# Patient Record
Sex: MALE | Race: WHITE | NOT HISPANIC OR LATINO
[De-identification: names, ages, dates, MRNs, and addresses within clinical notes are randomized per-mention and may not be internally consistent; named-entity substitution may affect disease eponyms.]

---

## 2017-03-16 ENCOUNTER — APPOINTMENT (OUTPATIENT)
Dept: UROLOGY | Facility: CLINIC | Age: 62
End: 2017-03-16

## 2017-03-23 ENCOUNTER — APPOINTMENT (OUTPATIENT)
Dept: UROLOGY | Facility: CLINIC | Age: 62
End: 2017-03-23

## 2017-03-23 DIAGNOSIS — N39.46 MIXED INCONTINENCE: ICD-10-CM

## 2017-03-23 DIAGNOSIS — N28.1 CYST OF KIDNEY, ACQUIRED: ICD-10-CM

## 2017-03-24 LAB
APPEARANCE: CLEAR
BACTERIA: NEGATIVE
BILIRUBIN URINE: NEGATIVE
BLOOD URINE: NEGATIVE
COLOR: YELLOW
CORE LAB FLUID CYTOLOGY: NORMAL
GLUCOSE QUALITATIVE U: NORMAL MG/DL
KETONES URINE: NEGATIVE
LEUKOCYTE ESTERASE URINE: NEGATIVE
MICROSCOPIC-UA: NORMAL
NITRITE URINE: NEGATIVE
PH URINE: 6.5
PROTEIN URINE: NEGATIVE MG/DL
PSA FREE FLD-MCNC: NORMAL
PSA FREE SERPL-MCNC: <0.01 NG/ML
PSA SERPL-MCNC: <0.01 NG/ML
RED BLOOD CELLS URINE: 16 /HPF
SPECIFIC GRAVITY URINE: 1.02
SQUAMOUS EPITHELIAL CELLS: 0 /HPF
UROBILINOGEN URINE: NORMAL MG/DL
WHITE BLOOD CELLS URINE: 0 /HPF

## 2017-06-14 ENCOUNTER — APPOINTMENT (OUTPATIENT)
Dept: CT IMAGING | Facility: IMAGING CENTER | Age: 62
End: 2017-06-14

## 2017-06-14 ENCOUNTER — OUTPATIENT (OUTPATIENT)
Dept: OUTPATIENT SERVICES | Facility: HOSPITAL | Age: 62
LOS: 1 days | End: 2017-06-14
Payer: COMMERCIAL

## 2017-06-14 DIAGNOSIS — C61 MALIGNANT NEOPLASM OF PROSTATE: ICD-10-CM

## 2017-06-14 PROCEDURE — 82565 ASSAY OF CREATININE: CPT

## 2017-06-14 PROCEDURE — 74178 CT ABD&PLV WO CNTR FLWD CNTR: CPT

## 2017-06-27 ENCOUNTER — OUTPATIENT (OUTPATIENT)
Dept: OUTPATIENT SERVICES | Facility: HOSPITAL | Age: 62
LOS: 1 days | End: 2017-06-27
Payer: MEDICAID

## 2017-06-27 ENCOUNTER — APPOINTMENT (OUTPATIENT)
Dept: ULTRASOUND IMAGING | Facility: IMAGING CENTER | Age: 62
End: 2017-06-27

## 2017-06-27 DIAGNOSIS — C61 MALIGNANT NEOPLASM OF PROSTATE: ICD-10-CM

## 2017-06-27 PROCEDURE — 76770 US EXAM ABDO BACK WALL COMP: CPT

## 2018-03-22 ENCOUNTER — APPOINTMENT (OUTPATIENT)
Dept: UROLOGY | Facility: CLINIC | Age: 63
End: 2018-03-22
Payer: COMMERCIAL

## 2018-03-22 ENCOUNTER — OUTPATIENT (OUTPATIENT)
Dept: OUTPATIENT SERVICES | Facility: HOSPITAL | Age: 63
LOS: 1 days | End: 2018-03-22
Payer: MEDICAID

## 2018-03-22 ENCOUNTER — APPOINTMENT (OUTPATIENT)
Dept: UROLOGY | Facility: CLINIC | Age: 63
End: 2018-03-22

## 2018-03-22 ENCOUNTER — APPOINTMENT (OUTPATIENT)
Dept: CT IMAGING | Facility: IMAGING CENTER | Age: 63
End: 2018-03-22
Payer: COMMERCIAL

## 2018-03-22 VITALS
WEIGHT: 218 LBS | HEIGHT: 70 IN | DIASTOLIC BLOOD PRESSURE: 91 MMHG | RESPIRATION RATE: 16 BRPM | HEART RATE: 82 BPM | TEMPERATURE: 98 F | SYSTOLIC BLOOD PRESSURE: 145 MMHG | BODY MASS INDEX: 31.21 KG/M2

## 2018-03-22 DIAGNOSIS — R35.0 FREQUENCY OF MICTURITION: ICD-10-CM

## 2018-03-22 DIAGNOSIS — Z00.00 ENCOUNTER FOR GENERAL ADULT MEDICAL EXAMINATION WITHOUT ABNORMAL FINDINGS: ICD-10-CM

## 2018-03-22 LAB
APPEARANCE: CLEAR
BACTERIA: NEGATIVE
BILIRUBIN URINE: NEGATIVE
BLOOD URINE: ABNORMAL
COLOR: YELLOW
GLUCOSE QUALITATIVE U: NEGATIVE MG/DL
KETONES URINE: NEGATIVE
LEUKOCYTE ESTERASE URINE: NEGATIVE
MICROSCOPIC-UA: NORMAL
NITRITE URINE: NEGATIVE
PH URINE: 7
PROTEIN URINE: NEGATIVE MG/DL
RED BLOOD CELLS URINE: 7 /HPF
SPECIFIC GRAVITY URINE: 1.03
SQUAMOUS EPITHELIAL CELLS: 0 /HPF
UROBILINOGEN URINE: NEGATIVE MG/DL
WHITE BLOOD CELLS URINE: 1 /HPF

## 2018-03-22 PROCEDURE — 74178 CT ABD&PLV WO CNTR FLWD CNTR: CPT | Mod: 26

## 2018-03-22 PROCEDURE — 52000 CYSTOURETHROSCOPY: CPT

## 2018-03-22 PROCEDURE — 99214 OFFICE O/P EST MOD 30 MIN: CPT | Mod: 25

## 2018-03-22 PROCEDURE — 74178 CT ABD&PLV WO CNTR FLWD CNTR: CPT

## 2018-03-23 LAB — CORE LAB FLUID CYTOLOGY: NORMAL

## 2018-03-24 LAB — BACTERIA UR CULT: NORMAL

## 2018-03-27 DIAGNOSIS — N21.0 CALCULUS IN BLADDER: ICD-10-CM

## 2018-03-27 DIAGNOSIS — R35.0 FREQUENCY OF MICTURITION: ICD-10-CM

## 2018-03-27 DIAGNOSIS — C61 MALIGNANT NEOPLASM OF PROSTATE: ICD-10-CM

## 2018-04-19 ENCOUNTER — APPOINTMENT (OUTPATIENT)
Dept: UROLOGY | Facility: CLINIC | Age: 63
End: 2018-04-19

## 2018-05-22 ENCOUNTER — APPOINTMENT (OUTPATIENT)
Dept: UROLOGY | Facility: CLINIC | Age: 63
End: 2018-05-22
Payer: COMMERCIAL

## 2018-05-22 DIAGNOSIS — C61 MALIGNANT NEOPLASM OF PROSTATE: ICD-10-CM

## 2018-05-22 DIAGNOSIS — N21.0 CALCULUS IN BLADDER: ICD-10-CM

## 2018-05-22 DIAGNOSIS — N35.9 URETHRAL STRICTURE, UNSPECIFIED: ICD-10-CM

## 2018-05-22 DIAGNOSIS — Z00.00 ENCOUNTER FOR GENERAL ADULT MEDICAL EXAMINATION W/OUT ABNORMAL FINDINGS: ICD-10-CM

## 2018-05-22 PROCEDURE — 52000 CYSTOURETHROSCOPY: CPT

## 2018-05-22 PROCEDURE — 99214 OFFICE O/P EST MOD 30 MIN: CPT | Mod: 25

## 2018-05-23 LAB
APPEARANCE: CLEAR
BACTERIA: NEGATIVE
BILIRUBIN URINE: NEGATIVE
BLOOD URINE: NEGATIVE
COLOR: YELLOW
GLUCOSE QUALITATIVE U: NEGATIVE MG/DL
HYALINE CASTS: 0 /LPF
KETONES URINE: NEGATIVE
LEUKOCYTE ESTERASE URINE: NEGATIVE
MICROSCOPIC-UA: NORMAL
NITRITE URINE: NEGATIVE
PH URINE: 6.5
PROTEIN URINE: ABNORMAL MG/DL
PSA FREE FLD-MCNC: NORMAL
PSA FREE SERPL-MCNC: <0.01 NG/ML
PSA SERPL-MCNC: <0.01 NG/ML
RED BLOOD CELLS URINE: 1 /HPF
SPECIFIC GRAVITY URINE: 1.02
SQUAMOUS EPITHELIAL CELLS: 0 /HPF
UROBILINOGEN URINE: NEGATIVE MG/DL
WHITE BLOOD CELLS URINE: 2 /HPF

## 2018-05-24 LAB
BACTERIA UR CULT: NORMAL
CORE LAB FLUID CYTOLOGY: NORMAL

## 2018-06-18 ENCOUNTER — APPOINTMENT (OUTPATIENT)
Dept: UROLOGY | Facility: AMBULATORY SURGERY CENTER | Age: 63
End: 2018-06-18

## 2018-06-28 RX ORDER — SOLIFENACIN SUCCINATE 5 MG/1
5 TABLET, FILM COATED ORAL
Qty: 90 | Refills: 2 | Status: ACTIVE | COMMUNITY
Start: 2018-06-28 | End: 1900-01-01

## 2018-10-23 ENCOUNTER — RX RENEWAL (OUTPATIENT)
Age: 63
End: 2018-10-23

## 2018-11-06 ENCOUNTER — APPOINTMENT (OUTPATIENT)
Dept: CARDIOLOGY | Facility: CLINIC | Age: 63
End: 2018-11-06
Payer: COMMERCIAL

## 2018-11-06 VITALS
BODY MASS INDEX: 31.5 KG/M2 | RESPIRATION RATE: 16 BRPM | WEIGHT: 220 LBS | SYSTOLIC BLOOD PRESSURE: 130 MMHG | OXYGEN SATURATION: 97 % | HEART RATE: 100 BPM | HEIGHT: 70 IN | DIASTOLIC BLOOD PRESSURE: 110 MMHG

## 2018-11-06 VITALS — DIASTOLIC BLOOD PRESSURE: 100 MMHG | SYSTOLIC BLOOD PRESSURE: 160 MMHG

## 2018-11-06 DIAGNOSIS — Z86.79 PERSONAL HISTORY OF OTHER DISEASES OF THE CIRCULATORY SYSTEM: ICD-10-CM

## 2018-11-06 PROCEDURE — 99244 OFF/OP CNSLTJ NEW/EST MOD 40: CPT

## 2018-11-06 NOTE — ASSESSMENT
[FreeTextEntry1] : Assessment:\par 1.  Chronic Deep venous insufficiency\par - hyperpigmentation\par - no visible superficial varicose veins\par - not wearing compression\par 2.  Resistant HTN\par - on 5 agents\par - not controlled in the office. \par 3.  Obesity\par 4.  Coronary disease \par 5.  Atrial fibrillation\par 6.  BRYSON\par 7.  Peripheral Neuropathy\par \par Plan\par 1.  In regards to the leg swelling:\par - He will initiate a trial of compression for deep venous insufficiency\par - weight loss\par - leg elevation\par - he is on Apixaban for Afib - doubt DVT.\par 2.  In regards to resistant HTN:\par -  I have discussed with Dr. Rodríguez if he can be evaluated for the SADIA HTN trial.\par - I will have the [employer] research department reach out to him for a SADIA screening visit\par \par Thanks\par \par Huang Barakat

## 2018-11-06 NOTE — REASON FOR VISIT
[Consultation] : a consultation regarding [FreeTextEntry2] : leg swelling, venous insufficiency, resistant HTN [FreeTextEntry1] :  Dr. Rodríguez, \par \par Thank you for allowing me to care for Mr. Dhillon.  As you know, he is a very pleasant 63 year old male with a history of atrial fibrillation, HTN, HLD, obesity.\par \par He is here for a vascular medicine consultation for leg swelling.  \par \par   The legs have been swollen for months.  Initially this was thought to be because of medications, but this was ruled out.  The legs remain swollen even after stopping a calcium channel blocker.  He is overweight.  He has developed a small ulceration on the left leg - for which he is placing a bandaid on.  He is unrestricted in his walking.  He feels like he is walking on pins and needles - likely has an element of peripheral neuropathy.  The toes and feet are a bluish /purple discoloration.  There is bilateral leg hyperpigmentation.  He just purchased compression stockings - has not worn this.  \par \par Currently he is not working, but usually works outdoors - [employer] department - [employer]. On his feet all day.  \par \par Medications:\par Eliquis 5mg BID\par Aspiin 81mg daily\par Atrovatstin 40mg daily\par Furosemide 40mg daily\par Losartan - HCTZ 100-25mg \par metoprolol 100mg daily\par Clonidine 0.3mg at night\par \par He is on 5 medications for HTN.  His blood pressure in the right arm (which is higher  is 160 systolic)\par

## 2018-11-06 NOTE — PHYSICAL EXAM
[General Appearance - Well Developed] : well developed [Normal Appearance] : normal appearance [Well Groomed] : well groomed [General Appearance - Well Nourished] : well nourished [No Deformities] : no deformities [General Appearance - In No Acute Distress] : no acute distress [Normal Conjunctiva] : the conjunctiva exhibited no abnormalities [Eyelids - No Xanthelasma] : the eyelids demonstrated no xanthelasmas [Normal Oral Mucosa] : normal oral mucosa [No Oral Pallor] : no oral pallor [No Oral Cyanosis] : no oral cyanosis [Heart Rate And Rhythm] : heart rate and rhythm were normal [Heart Sounds] : normal S1 and S2 [Murmurs] : no murmurs present [Respiration, Rhythm And Depth] : normal respiratory rhythm and effort [Exaggerated Use Of Accessory Muscles For Inspiration] : no accessory muscle use [Auscultation Breath Sounds / Voice Sounds] : lungs were clear to auscultation bilaterally [Abdomen Soft] : soft [Abdomen Tenderness] : non-tender [Abdomen Mass (___ Cm)] : no abdominal mass palpated [Abnormal Walk] : normal gait [Gait - Sufficient For Exercise Testing] : the gait was sufficient for exercise testing [Normal Jugular Venous A Waves Present] : normal jugular venous A waves present [Normal Jugular Venous V Waves Present] : normal jugular venous V waves present [No Jugular Venous Perdomo A Waves] : no jugular venous perdomo A waves [FreeTextEntry1] : bilateral edema.  hyperpigmentation bilaterally.  strong, palp pedal pulses.  no visibile varicose veins.    Small, shallow, clean base ulcer of the L anterior shin - healing.  [Skin Color & Pigmentation] : normal skin color and pigmentation [] : no rash [No Venous Stasis] : no venous stasis [Skin Lesions] : no skin lesions [No Skin Ulcers] : no skin ulcer [No Xanthoma] : no  xanthoma was observed [Oriented To Time, Place, And Person] : oriented to person, place, and time [Affect] : the affect was normal [Mood] : the mood was normal [No Anxiety] : not feeling anxious

## 2019-01-28 ENCOUNTER — APPOINTMENT (OUTPATIENT)
Dept: CARDIOLOGY | Facility: CLINIC | Age: 64
End: 2019-01-28
Payer: COMMERCIAL

## 2019-01-28 VITALS
BODY MASS INDEX: 31.21 KG/M2 | DIASTOLIC BLOOD PRESSURE: 101 MMHG | HEIGHT: 70 IN | OXYGEN SATURATION: 97 % | SYSTOLIC BLOOD PRESSURE: 147 MMHG | WEIGHT: 218 LBS | HEART RATE: 84 BPM

## 2019-01-28 VITALS — TEMPERATURE: 98.7 F | RESPIRATION RATE: 14 BRPM

## 2019-01-28 PROCEDURE — 99214 OFFICE O/P EST MOD 30 MIN: CPT

## 2019-01-31 ENCOUNTER — RX RENEWAL (OUTPATIENT)
Age: 64
End: 2019-01-31

## 2019-02-04 NOTE — ASSESSMENT
[FreeTextEntry1] : Assessment:\par 1.  Chronic Deep venous insufficiency\par - Currently well controlled and clinically insignificant\par - No edema on the right trace edema on the left\par 2.  Resistant HTN\par - on 4 agents\par - not controlled in the office. \par - no YULIA\par - no BRYSON\par 3.  Obesity\par 4.  Coronary disease with PCI\par 5.  Atrial fibrillation on A/C \par 6.  Negative BRYSON testing per patient\par 7.  Peripheral Neuropathy\par \par Plan\par 1.  In regards to the leg swelling, it has resolved with lifestyle modifications.  There is no edema of the R leg and trace ankle edema on the left.  It is not clinically significant.  \par  2.  In regards to resistant HTN:\par -  He remains poorly controlled on 4 agents, stable regimen for 3 months.\par - will enroll in SADIA HTN trial.  Consent signed\par 3.  Return in 2 weeks for screening visit 2 and non-invasive testing\par 4.  No changes to his antihypertension regimen\par 5.  discussed with his cardiologist Dr. Rodríguez.  \par \par \par Thanks\par \par Huang Barakat

## 2019-02-04 NOTE — REASON FOR VISIT
[Follow-Up - Clinic] : a clinic follow-up of [FreeTextEntry2] : leg swelling, venous insufficiency, resistant HTN [FreeTextEntry1] :  63 year old male with a history of atrial fibrillation, HTN, HLD, obesity, coronary artery disease with PCI 6 years ago.  Here for SADIA HTN evaluation.   \par  \par

## 2019-02-04 NOTE — PHYSICAL EXAM
[General Appearance - Well Developed] : well developed [Normal Appearance] : normal appearance [Well Groomed] : well groomed [General Appearance - Well Nourished] : well nourished [No Deformities] : no deformities [General Appearance - In No Acute Distress] : no acute distress [Normal Conjunctiva] : the conjunctiva exhibited no abnormalities [Eyelids - No Xanthelasma] : the eyelids demonstrated no xanthelasmas [Normal Oral Mucosa] : normal oral mucosa [No Oral Pallor] : no oral pallor [No Oral Cyanosis] : no oral cyanosis [Normal Jugular Venous A Waves Present] : normal jugular venous A waves present [Normal Jugular Venous V Waves Present] : normal jugular venous V waves present [No Jugular Venous Perdomo A Waves] : no jugular venous perdomo A waves [Respiration, Rhythm And Depth] : normal respiratory rhythm and effort [Exaggerated Use Of Accessory Muscles For Inspiration] : no accessory muscle use [Auscultation Breath Sounds / Voice Sounds] : lungs were clear to auscultation bilaterally [Heart Rate And Rhythm] : heart rate and rhythm were normal [Heart Sounds] : normal S1 and S2 [Murmurs] : no murmurs present [Abdomen Soft] : soft [Abdomen Tenderness] : non-tender [Abdomen Mass (___ Cm)] : no abdominal mass palpated [Abnormal Walk] : normal gait [Gait - Sufficient For Exercise Testing] : the gait was sufficient for exercise testing [Skin Color & Pigmentation] : normal skin color and pigmentation [] : no rash [No Venous Stasis] : no venous stasis [Skin Lesions] : no skin lesions [No Skin Ulcers] : no skin ulcer [No Xanthoma] : no  xanthoma was observed [Oriented To Time, Place, And Person] : oriented to person, place, and time [Affect] : the affect was normal [Mood] : the mood was normal [No Anxiety] : not feeling anxious [FreeTextEntry1] : No edema right leg.  strong, palp pedal pulses.  no visible varicose veins.   No ulceration. Mild LLE edema.

## 2019-02-04 NOTE — HISTORY OF PRESENT ILLNESS
[FreeTextEntry1] : 1/28/2019\par Was taken off Lasix 5 months ago\par \par Current medications:\par Clonidine 0.3\par Losaratn  = 25\par Metoprolol 100 daily \par Aspirin 81\par Eliquis 5mg BID\par \par r arm \par 153/92\par 151/99\par 166/101\par 160/105\par l arm\par 159/91\par 155/104\par 155/106\par 157/109\par \par Wearing compression stocking.  No leg swelling.  leg ulceration has healed.  No visible varicose veins on exam.  His leg swelling has resolved.   No recent changes to his BP medications.\par \par Coronary stents - placed 6 years ago\par Never smoker. \par \par Was tested for BRYSON, negative.  \par non-contrast CT of the abdomen did not show any calcification of the renal arteries.  \par \mary alice Has had HTN for many years, currently on a stable regimen of 4 agents (including HCTZ) for over 3 months.  Despite this, his BP remains elevated in the office.  Average BP is 159 systolic on the right arm

## 2019-02-05 ENCOUNTER — APPOINTMENT (OUTPATIENT)
Dept: CARDIOLOGY | Facility: CLINIC | Age: 64
End: 2019-02-05

## 2019-02-10 ENCOUNTER — FORM ENCOUNTER (OUTPATIENT)
Age: 64
End: 2019-02-10

## 2019-02-11 ENCOUNTER — APPOINTMENT (OUTPATIENT)
Dept: CARDIOLOGY | Facility: CLINIC | Age: 64
End: 2019-02-11
Payer: MEDICAID

## 2019-02-11 ENCOUNTER — OUTPATIENT (OUTPATIENT)
Dept: OUTPATIENT SERVICES | Facility: HOSPITAL | Age: 64
LOS: 1 days | End: 2019-02-11
Payer: SUBSIDIZED

## 2019-02-11 ENCOUNTER — APPOINTMENT (OUTPATIENT)
Dept: ULTRASOUND IMAGING | Facility: HOSPITAL | Age: 64
End: 2019-02-11
Payer: COMMERCIAL

## 2019-02-11 VITALS
BODY MASS INDEX: 31.21 KG/M2 | HEART RATE: 92 BPM | HEIGHT: 70 IN | SYSTOLIC BLOOD PRESSURE: 157 MMHG | OXYGEN SATURATION: 96 % | DIASTOLIC BLOOD PRESSURE: 126 MMHG | WEIGHT: 218 LBS | RESPIRATION RATE: 16 BRPM | TEMPERATURE: 98.9 F

## 2019-02-11 VITALS
OXYGEN SATURATION: 95 % | DIASTOLIC BLOOD PRESSURE: 99 MMHG | SYSTOLIC BLOOD PRESSURE: 158 MMHG | HEART RATE: 69 BPM | HEIGHT: 70 IN

## 2019-02-11 DIAGNOSIS — Z00.00 ENCOUNTER FOR GENERAL ADULT MEDICAL EXAMINATION WITHOUT ABNORMAL FINDINGS: ICD-10-CM

## 2019-02-11 DIAGNOSIS — Z00.6 ENCOUNTER FOR EXAMINATION FOR NORMAL COMPARISON AND CONTROL IN CLINICAL RESEARCH PROGRAM: ICD-10-CM

## 2019-02-11 PROCEDURE — 93925 LOWER EXTREMITY STUDY: CPT

## 2019-02-11 PROCEDURE — 93970 EXTREMITY STUDY: CPT

## 2019-02-11 PROCEDURE — 93925 LOWER EXTREMITY STUDY: CPT | Mod: 26

## 2019-02-11 PROCEDURE — 93970 EXTREMITY STUDY: CPT | Mod: 26

## 2019-02-11 PROCEDURE — 93923 UPR/LXTR ART STDY 3+ LVLS: CPT | Mod: 26

## 2019-02-11 PROCEDURE — 93923 UPR/LXTR ART STDY 3+ LVLS: CPT

## 2019-02-11 PROCEDURE — 99215 OFFICE O/P EST HI 40 MIN: CPT

## 2019-02-11 NOTE — REASON FOR VISIT
[Follow-Up - Clinic] : a clinic follow-up of [FreeTextEntry2] : leg swelling, venous insufficiency, resistant HTN [FreeTextEntry1] :  63 year old male with a history of atrial fibrillation, HTN, HLD, obesity, coronary artery disease with PCI 6 years ago.  Here for SADIA HTN evaluation.   \par  \par  2018 08:10

## 2019-02-11 NOTE — ASSESSMENT
[FreeTextEntry1] : Assessment:\par 1.  Chronic Deep venous insufficiency\par - Currently well controlled and clinically insignificant\par - No edema on the right trace edema on the left\par 2.  Resistant HTN\par - on 4 agents\par - not controlled in the office. \par - no YULIA\par - no BRYSON\par 3.  Obesity\par 4.  Coronary disease with PCI\par 5.  Atrial fibrillation on A/C \par 6.  Negative BRYSON testing per patient\par 7.  Peripheral Neuropathy\par \par Plan\par 1.  He remains poorly controlled on 4 agents, stable regimen for 3 months.\par  2.  This was screening visit 2  - BP remains elevated. \par 3.  Plan for Ambulatory BP 24 hour readings and repeat visit in 2 weeks paired with echocardiogram\par 4.  No changes to his antihypertension regimen\par 5.  discussed with his cardiologist Dr. Rodríguez.  \par \par \par Thanks\par \par Huang Barakat

## 2019-03-04 ENCOUNTER — NON-APPOINTMENT (OUTPATIENT)
Age: 64
End: 2019-03-04

## 2019-03-04 ENCOUNTER — APPOINTMENT (OUTPATIENT)
Dept: CV DIAGNOSITCS | Facility: HOSPITAL | Age: 64
End: 2019-03-04

## 2019-03-04 ENCOUNTER — OUTPATIENT (OUTPATIENT)
Dept: OUTPATIENT SERVICES | Facility: HOSPITAL | Age: 64
LOS: 1 days | End: 2019-03-04
Payer: SUBSIDIZED

## 2019-03-04 ENCOUNTER — APPOINTMENT (OUTPATIENT)
Dept: CARDIOLOGY | Facility: CLINIC | Age: 64
End: 2019-03-04
Payer: SUBSIDIZED

## 2019-03-04 VITALS
BODY MASS INDEX: 31.21 KG/M2 | WEIGHT: 218 LBS | OXYGEN SATURATION: 96 % | HEART RATE: 72 BPM | DIASTOLIC BLOOD PRESSURE: 90 MMHG | SYSTOLIC BLOOD PRESSURE: 150 MMHG | HEIGHT: 70 IN

## 2019-03-04 VITALS — RESPIRATION RATE: 16 BRPM

## 2019-03-04 DIAGNOSIS — I35.0 NONRHEUMATIC AORTIC (VALVE) STENOSIS: ICD-10-CM

## 2019-03-04 DIAGNOSIS — I48.91 UNSPECIFIED ATRIAL FIBRILLATION: ICD-10-CM

## 2019-03-04 PROCEDURE — 99215 OFFICE O/P EST HI 40 MIN: CPT

## 2019-03-04 PROCEDURE — C8929: CPT

## 2019-03-04 PROCEDURE — 93306 TTE W/DOPPLER COMPLETE: CPT | Mod: 26

## 2019-03-04 PROCEDURE — 93000 ELECTROCARDIOGRAM COMPLETE: CPT

## 2019-03-08 ENCOUNTER — APPOINTMENT (OUTPATIENT)
Dept: CARDIOLOGY | Facility: CLINIC | Age: 64
End: 2019-03-08

## 2019-03-11 ENCOUNTER — APPOINTMENT (OUTPATIENT)
Dept: CV DIAGNOSITCS | Facility: HOSPITAL | Age: 64
End: 2019-03-11

## 2019-03-14 NOTE — ASSESSMENT
[FreeTextEntry1] : Assessment:\par 1.  Chronic Deep venous insufficiency\par - Currently well controlled and clinically insignificant\par - No edema on the right trace edema on the left\par 2.  Resistant HTN\par - on 4 agents\par - not controlled in the office. \par - no YULIA\par - no BRYSON\par 3.  Obesity\par 4.  Coronary disease with PCI\par 5.  Atrial fibrillation on A/C \par 6.  Negative BRYSON testing per patient\par 7.  Peripheral Neuropathy\par \par Plan\par 1.  He remains poorly controlled on 4 agents, stable regimen for 3 months.\par  2.  This was screening visit 3  - BP remains elevated. \par 3. Ambulatory BP 24 hour reading meets criteria for inclusion\par 4.  No changes to his antihypertension regimen\par 5.  Plan for Randomization with Dr. Pineda.   with office visit 1 week afterwards.\par 6.  HOLD ELIQUIS 24 hours prior to the procedure. \par \par Thanks\par \par Huang Barakat

## 2019-03-14 NOTE — HISTORY OF PRESENT ILLNESS
[FreeTextEntry1] : 3/4/2019\par Doing well.  no issues.  NYHA class 1\par No dypsnea\par No chest pain\par No bleeding or bruising\par No changes to his medications\par No leg swelling.  Wearing compression\par 24 hour cuff:  above 140 systolic\par Left arm \par 165/114\par 156/104\par 155/107\par \par Current medications:\par Clonidine 0.3\par Losaratn//25\par Metoprolol 100 daily \par Aspirin 81\par Eliquis 5mg BID\par Atorvastatin 40mg daily \par \par \par 2/11/2019\par Doing well\par no new issues \par Took his medications today\par vascular testing today milagros/ arterial duplex/ venous duplex\par No changes to his medications\par Breathing ok, no cp, no sob.  \par No leg swelling.  He is wearing compression stockings.  \par \par L arm\par 162/97\par 153/108\par 151/104\par 164/97\par \par Average systolic >155\par \par \par R arm \par 151/91\par 139/103\par \par \par Current medications:\par Clonidine 0.3\par Losaratn//25\par Metoprolol 100 daily \par Aspirin 81\par Eliquis 5mg BID\par Atorvastatin 40mg daily \par \par 1/28/2019\par Was taken off Lasix 5 months ago\par \par Current medications:\par Clonidine 0.3\par Losaratn  = 25\par Metoprolol 100 daily \par Aspirin 81\par Eliquis 5mg BID\par \par r arm \par 153/92\par 151/99\par 166/101\par 160/105\par l arm\par 159/91\par 155/104\par 155/106\par 157/109\par \par Wearing compression stocking.  No leg swelling.  leg ulceration has healed.  No visible varicose veins on exam.  His leg swelling has resolved.   No recent changes to his BP medications.\par \par Coronary stents - placed 6 years ago\par Never smoker. \par \par Was tested for BRYSON, negative.  \par non-contrast CT of the abdomen did not show any calcification of the renal arteries.  \par \par Has had HTN for many years, currently on a stable regimen of 4 agents (including HCTZ) for over 3 months.  Despite this, his BP remains elevated in the office.  Average BP is 159 systolic on the right arm

## 2019-03-18 ENCOUNTER — TRANSCRIPTION ENCOUNTER (OUTPATIENT)
Age: 64
End: 2019-03-18

## 2019-04-16 ENCOUNTER — INPATIENT (INPATIENT)
Facility: HOSPITAL | Age: 64
LOS: 0 days | Discharge: HOME | DRG: 287 | End: 2019-04-17
Attending: INTERNAL MEDICINE | Admitting: INTERNAL MEDICINE
Payer: SUBSIDIZED

## 2019-04-16 VITALS
TEMPERATURE: 99 F | SYSTOLIC BLOOD PRESSURE: 190 MMHG | WEIGHT: 218.92 LBS | OXYGEN SATURATION: 95 % | HEART RATE: 102 BPM | DIASTOLIC BLOOD PRESSURE: 93 MMHG | RESPIRATION RATE: 18 BRPM | HEIGHT: 70 IN

## 2019-04-16 DIAGNOSIS — I42.9 CARDIOMYOPATHY, UNSPECIFIED: ICD-10-CM

## 2019-04-16 DIAGNOSIS — Z90.79 ACQUIRED ABSENCE OF OTHER GENITAL ORGAN(S): Chronic | ICD-10-CM

## 2019-04-16 DIAGNOSIS — Z95.828 PRESENCE OF OTHER VASCULAR IMPLANTS AND GRAFTS: Chronic | ICD-10-CM

## 2019-04-16 LAB
ALBUMIN SERPL ELPH-MCNC: 4.5 G/DL — SIGNIFICANT CHANGE UP (ref 3.3–5)
ALP SERPL-CCNC: 71 U/L — SIGNIFICANT CHANGE UP (ref 40–120)
ALT FLD-CCNC: 34 U/L — SIGNIFICANT CHANGE UP (ref 10–45)
ANION GAP SERPL CALC-SCNC: 14 MMOL/L — SIGNIFICANT CHANGE UP (ref 5–17)
AST SERPL-CCNC: 65 U/L — HIGH (ref 10–40)
BILIRUB SERPL-MCNC: 0.9 MG/DL — SIGNIFICANT CHANGE UP (ref 0.2–1.2)
BUN SERPL-MCNC: 26 MG/DL — HIGH (ref 7–23)
CALCIUM SERPL-MCNC: 10.5 MG/DL — SIGNIFICANT CHANGE UP (ref 8.4–10.5)
CHLORIDE SERPL-SCNC: 96 MMOL/L — SIGNIFICANT CHANGE UP (ref 96–108)
CO2 SERPL-SCNC: 25 MMOL/L — SIGNIFICANT CHANGE UP (ref 22–31)
CREAT SERPL-MCNC: 1.21 MG/DL — SIGNIFICANT CHANGE UP (ref 0.5–1.3)
GLUCOSE SERPL-MCNC: 136 MG/DL — HIGH (ref 70–99)
HCT VFR BLD CALC: 50.3 % — HIGH (ref 39–50)
HGB BLD-MCNC: 17.4 G/DL — HIGH (ref 13–17)
MCHC RBC-ENTMCNC: 31.3 PG — SIGNIFICANT CHANGE UP (ref 27–34)
MCHC RBC-ENTMCNC: 34.5 GM/DL — SIGNIFICANT CHANGE UP (ref 32–36)
MCV RBC AUTO: 90.5 FL — SIGNIFICANT CHANGE UP (ref 80–100)
PLATELET # BLD AUTO: 224 K/UL — SIGNIFICANT CHANGE UP (ref 150–400)
POTASSIUM SERPL-MCNC: 4.8 MMOL/L — SIGNIFICANT CHANGE UP (ref 3.5–5.3)
POTASSIUM SERPL-SCNC: 4.8 MMOL/L — SIGNIFICANT CHANGE UP (ref 3.5–5.3)
PROT SERPL-MCNC: 8.2 G/DL — SIGNIFICANT CHANGE UP (ref 6–8.3)
RBC # BLD: 5.56 M/UL — SIGNIFICANT CHANGE UP (ref 4.2–5.8)
RBC # FLD: 12.7 % — SIGNIFICANT CHANGE UP (ref 10.3–14.5)
SODIUM SERPL-SCNC: 135 MMOL/L — SIGNIFICANT CHANGE UP (ref 135–145)
WBC # BLD: 9.4 K/UL — SIGNIFICANT CHANGE UP (ref 3.8–10.5)
WBC # FLD AUTO: 9.4 K/UL — SIGNIFICANT CHANGE UP (ref 3.8–10.5)

## 2019-04-16 PROCEDURE — 75710 ARTERY X-RAYS ARM/LEG: CPT | Mod: 26,59

## 2019-04-16 PROCEDURE — 36140 INTRO NDL ICATH UPR/LXTR ART: CPT | Mod: 59

## 2019-04-16 PROCEDURE — 75820 VEIN X-RAY ARM/LEG: CPT | Mod: 26,59

## 2019-04-16 PROCEDURE — 93451 RIGHT HEART CATH: CPT | Mod: 26

## 2019-04-16 PROCEDURE — 93010 ELECTROCARDIOGRAM REPORT: CPT

## 2019-04-16 RX ORDER — METOPROLOL TARTRATE 50 MG
100 TABLET ORAL DAILY
Qty: 0 | Refills: 0 | Status: DISCONTINUED | OUTPATIENT
Start: 2019-04-16 | End: 2019-04-17

## 2019-04-16 RX ORDER — ASPIRIN/CALCIUM CARB/MAGNESIUM 324 MG
81 TABLET ORAL DAILY
Qty: 0 | Refills: 0 | Status: DISCONTINUED | OUTPATIENT
Start: 2019-04-16 | End: 2019-04-17

## 2019-04-16 RX ORDER — HYDRALAZINE HCL 50 MG
5 TABLET ORAL ONCE
Qty: 0 | Refills: 0 | Status: COMPLETED | OUTPATIENT
Start: 2019-04-16 | End: 2019-04-16

## 2019-04-16 RX ORDER — HYDROCHLOROTHIAZIDE 25 MG
25 TABLET ORAL DAILY
Qty: 0 | Refills: 0 | Status: DISCONTINUED | OUTPATIENT
Start: 2019-04-16 | End: 2019-04-17

## 2019-04-16 RX ORDER — CHOLECALCIFEROL (VITAMIN D3) 125 MCG
2000 CAPSULE ORAL DAILY
Qty: 0 | Refills: 0 | Status: DISCONTINUED | OUTPATIENT
Start: 2019-04-16 | End: 2019-04-17

## 2019-04-16 RX ORDER — ATORVASTATIN CALCIUM 80 MG/1
40 TABLET, FILM COATED ORAL AT BEDTIME
Qty: 0 | Refills: 0 | Status: DISCONTINUED | OUTPATIENT
Start: 2019-04-16 | End: 2019-04-17

## 2019-04-16 RX ORDER — LOSARTAN POTASSIUM 100 MG/1
100 TABLET, FILM COATED ORAL DAILY
Qty: 0 | Refills: 0 | Status: DISCONTINUED | OUTPATIENT
Start: 2019-04-16 | End: 2019-04-17

## 2019-04-16 RX ORDER — PREGABALIN 225 MG/1
1000 CAPSULE ORAL DAILY
Qty: 0 | Refills: 0 | Status: DISCONTINUED | OUTPATIENT
Start: 2019-04-16 | End: 2019-04-17

## 2019-04-16 RX ADMIN — Medication 25 MILLIGRAM(S): at 15:24

## 2019-04-16 RX ADMIN — Medication 0.3 MILLIGRAM(S): at 19:30

## 2019-04-16 RX ADMIN — Medication 81 MILLIGRAM(S): at 15:25

## 2019-04-16 RX ADMIN — Medication 5 MILLIGRAM(S): at 17:35

## 2019-04-16 RX ADMIN — LOSARTAN POTASSIUM 100 MILLIGRAM(S): 100 TABLET, FILM COATED ORAL at 15:24

## 2019-04-16 RX ADMIN — Medication 100 MILLIGRAM(S): at 15:24

## 2019-04-16 RX ADMIN — ATORVASTATIN CALCIUM 40 MILLIGRAM(S): 80 TABLET, FILM COATED ORAL at 21:19

## 2019-04-16 NOTE — H&P CARDIOLOGY - HISTORY OF PRESENT ILLNESS
63 year old male with a history of atrial fibrillation-on Eliquis last dose 4/14/19. , HTN, HLD, obesity, CAD with PCI 6 years ago. Here for SADIA study for HTN. 63 year old male with a history of atrial fibrillation-on Eliquis last dose 4/14/19. , HTN, HLD, obesity, CAD with PCI 6 years ago, BRYSON-does not use CPAP presents for SADIA study for HTN.

## 2019-04-17 ENCOUNTER — TRANSCRIPTION ENCOUNTER (OUTPATIENT)
Age: 64
End: 2019-04-17

## 2019-04-17 VITALS
SYSTOLIC BLOOD PRESSURE: 129 MMHG | TEMPERATURE: 98 F | OXYGEN SATURATION: 98 % | RESPIRATION RATE: 18 BRPM | DIASTOLIC BLOOD PRESSURE: 109 MMHG | HEART RATE: 92 BPM

## 2019-04-17 LAB
ALBUMIN SERPL ELPH-MCNC: 3.9 G/DL — SIGNIFICANT CHANGE UP (ref 3.3–5)
ALP SERPL-CCNC: 63 U/L — SIGNIFICANT CHANGE UP (ref 40–120)
ALT FLD-CCNC: 27 U/L — SIGNIFICANT CHANGE UP (ref 10–45)
ANION GAP SERPL CALC-SCNC: 11 MMOL/L — SIGNIFICANT CHANGE UP (ref 5–17)
AST SERPL-CCNC: 25 U/L — SIGNIFICANT CHANGE UP (ref 10–40)
BASOPHILS # BLD AUTO: 0 K/UL — SIGNIFICANT CHANGE UP (ref 0–0.2)
BASOPHILS NFR BLD AUTO: 0.2 % — SIGNIFICANT CHANGE UP (ref 0–2)
BILIRUB SERPL-MCNC: 0.7 MG/DL — SIGNIFICANT CHANGE UP (ref 0.2–1.2)
BUN SERPL-MCNC: 25 MG/DL — HIGH (ref 7–23)
CALCIUM SERPL-MCNC: 9.8 MG/DL — SIGNIFICANT CHANGE UP (ref 8.4–10.5)
CHLORIDE SERPL-SCNC: 99 MMOL/L — SIGNIFICANT CHANGE UP (ref 96–108)
CO2 SERPL-SCNC: 27 MMOL/L — SIGNIFICANT CHANGE UP (ref 22–31)
CREAT SERPL-MCNC: 1.1 MG/DL — SIGNIFICANT CHANGE UP (ref 0.5–1.3)
EOSINOPHIL # BLD AUTO: 0.4 K/UL — SIGNIFICANT CHANGE UP (ref 0–0.5)
EOSINOPHIL NFR BLD AUTO: 4.5 % — SIGNIFICANT CHANGE UP (ref 0–6)
GLUCOSE SERPL-MCNC: 146 MG/DL — HIGH (ref 70–99)
HCT VFR BLD CALC: 46.7 % — SIGNIFICANT CHANGE UP (ref 39–50)
HGB BLD-MCNC: 15.8 G/DL — SIGNIFICANT CHANGE UP (ref 13–17)
LYMPHOCYTES # BLD AUTO: 1.9 K/UL — SIGNIFICANT CHANGE UP (ref 1–3.3)
LYMPHOCYTES # BLD AUTO: 19.5 % — SIGNIFICANT CHANGE UP (ref 13–44)
MCHC RBC-ENTMCNC: 30.4 PG — SIGNIFICANT CHANGE UP (ref 27–34)
MCHC RBC-ENTMCNC: 33.8 GM/DL — SIGNIFICANT CHANGE UP (ref 32–36)
MCV RBC AUTO: 90.1 FL — SIGNIFICANT CHANGE UP (ref 80–100)
MONOCYTES # BLD AUTO: 1.1 K/UL — HIGH (ref 0–0.9)
MONOCYTES NFR BLD AUTO: 11.6 % — SIGNIFICANT CHANGE UP (ref 2–14)
NEUTROPHILS # BLD AUTO: 6.4 K/UL — SIGNIFICANT CHANGE UP (ref 1.8–7.4)
NEUTROPHILS NFR BLD AUTO: 64.3 % — SIGNIFICANT CHANGE UP (ref 43–77)
PLATELET # BLD AUTO: 223 K/UL — SIGNIFICANT CHANGE UP (ref 150–400)
POTASSIUM SERPL-MCNC: 3.3 MMOL/L — LOW (ref 3.5–5.3)
POTASSIUM SERPL-SCNC: 3.3 MMOL/L — LOW (ref 3.5–5.3)
PROT SERPL-MCNC: 6.8 G/DL — SIGNIFICANT CHANGE UP (ref 6–8.3)
RBC # BLD: 5.18 M/UL — SIGNIFICANT CHANGE UP (ref 4.2–5.8)
RBC # FLD: 12.5 % — SIGNIFICANT CHANGE UP (ref 10.3–14.5)
SODIUM SERPL-SCNC: 137 MMOL/L — SIGNIFICANT CHANGE UP (ref 135–145)
WBC # BLD: 9.9 K/UL — SIGNIFICANT CHANGE UP (ref 3.8–10.5)
WBC # FLD AUTO: 9.9 K/UL — SIGNIFICANT CHANGE UP (ref 3.8–10.5)

## 2019-04-17 PROCEDURE — C1894: CPT

## 2019-04-17 PROCEDURE — 93451 RIGHT HEART CATH: CPT

## 2019-04-17 PROCEDURE — 75820 VEIN X-RAY ARM/LEG: CPT | Mod: 59

## 2019-04-17 PROCEDURE — 75710 ARTERY X-RAYS ARM/LEG: CPT | Mod: 59

## 2019-04-17 PROCEDURE — 99232 SBSQ HOSP IP/OBS MODERATE 35: CPT

## 2019-04-17 PROCEDURE — 85027 COMPLETE CBC AUTOMATED: CPT

## 2019-04-17 PROCEDURE — 93005 ELECTROCARDIOGRAM TRACING: CPT

## 2019-04-17 PROCEDURE — 80053 COMPREHEN METABOLIC PANEL: CPT

## 2019-04-17 PROCEDURE — C1769: CPT

## 2019-04-17 PROCEDURE — 36140 INTRO NDL ICATH UPR/LXTR ART: CPT | Mod: 59

## 2019-04-17 RX ORDER — POTASSIUM CHLORIDE 20 MEQ
20 PACKET (EA) ORAL
Qty: 0 | Refills: 0 | Status: COMPLETED | OUTPATIENT
Start: 2019-04-17 | End: 2019-04-17

## 2019-04-17 RX ORDER — LOSARTAN POTASSIUM 100 MG/1
1 TABLET, FILM COATED ORAL
Qty: 0 | Refills: 0 | COMMUNITY
Start: 2019-04-17

## 2019-04-17 RX ADMIN — Medication 25 MILLIGRAM(S): at 05:45

## 2019-04-17 RX ADMIN — Medication 81 MILLIGRAM(S): at 05:45

## 2019-04-17 RX ADMIN — Medication 20 MILLIEQUIVALENT(S): at 05:44

## 2019-04-17 RX ADMIN — Medication 20 MILLIEQUIVALENT(S): at 06:32

## 2019-04-17 RX ADMIN — LOSARTAN POTASSIUM 100 MILLIGRAM(S): 100 TABLET, FILM COATED ORAL at 05:45

## 2019-04-17 RX ADMIN — Medication 2000 UNIT(S): at 10:11

## 2019-04-17 RX ADMIN — PREGABALIN 1000 MICROGRAM(S): 225 CAPSULE ORAL at 10:11

## 2019-04-17 RX ADMIN — Medication 100 MILLIGRAM(S): at 05:45

## 2019-04-17 RX ADMIN — Medication 1 TABLET(S): at 10:11

## 2019-04-17 NOTE — PROGRESS NOTE ADULT - SUBJECTIVE AND OBJECTIVE BOX
Patient is a 63y old  Male who presents with uncontrolled HTN now s/p unsuccessful RCX study via RFA         Allergies    No Known Allergies    Intolerances        Medications:  aspirin enteric coated 81 milliGRAM(s) Oral daily  atorvastatin 40 milliGRAM(s) Oral at bedtime  cholecalciferol 2000 Unit(s) Oral daily  cloNIDine 0.3 milliGRAM(s) Oral at bedtime  cyanocobalamin 1000 MICROGram(s) Oral daily  hydrochlorothiazide 25 milliGRAM(s) Oral daily  losartan 100 milliGRAM(s) Oral daily  metoprolol succinate  milliGRAM(s) Oral daily  multivitamin 1 Tablet(s) Oral daily  potassium chloride    Tablet ER 20 milliEquivalent(s) Oral every 2 hours      Vitals:  T(C): 36.9 (19 @ 20:55), Max: 37.3 (19 @ 11:17)  HR: 102 (19 @ 20:55) (87 - 118)  BP: 134/90 (19 @ 20:55) (134/90 - 190/93)  BP(mean): 125 (19 @ 11:17) (125 - 125)  RR: 18 (19 @ 20:55) (18 - 18)  SpO2: 98% (19 @ 20:55) (95% - 100%)  Wt(kg): --  Daily Height in cm: 177.8 (2019 15:00)    Daily Weight in k.3 (2019 11:17)  I&O's Summary    2019 07:01  -  2019 05:09  --------------------------------------------------------  IN: 300 mL / OUT: 400 mL / NET: -100 mL          Physical Exam:  Appearance: Normal  Procedural Access Site: Right femoral artery access. No hematoma, Non-tender to palpation, 2+ pulse, No bruit, No Ecchymosis  Respiratory: Clear to auscultation bilaterally  Psychiatry: AAOx3, Mood & affect appropriate  Skin: No rashes, No ecchymoses, No cyanosis    04-17    137  |  99  |  25<H>  ----------------------------<  146<H>  3.3<L>   |  27  |  1.10    Ca    9.8      2019 00:04    TPro  6.8  /  Alb  3.9  /  TBili  0.7  /  DBili  x   /  AST  25  /  ALT  27  /  AlkPhos  63  04-17      Interpretation of Telemetry:

## 2019-04-17 NOTE — PROGRESS NOTE ADULT - SUBJECTIVE AND OBJECTIVE BOX
Vascular Cardiology  Progress note     S[facility] [id number]              [email or web address] [email or web address]   OFFICE [phone]    CC:  HTN    INTERVAL HISTORY:  Procedure unable to be completed due to anatomy  No issues overnight.  R groin feels fine.  no cp or sob         Allergies    No Known Allergies    Intolerances    	    MEDICATIONS:  aspirin enteric coated 81 milliGRAM(s) Oral daily  cloNIDine 0.3 milliGRAM(s) Oral at bedtime  hydrochlorothiazide 25 milliGRAM(s) Oral daily  losartan 100 milliGRAM(s) Oral daily  metoprolol succinate  milliGRAM(s) Oral daily            atorvastatin 40 milliGRAM(s) Oral at bedtime    cholecalciferol 2000 Unit(s) Oral daily  cyanocobalamin 1000 MICROGram(s) Oral daily  multivitamin 1 Tablet(s) Oral daily      PAST MEDICAL & SURGICAL HISTORY:  Prostate cancer  HLD (hyperlipidemia)  Hypertension  Atrial fibrillation, unspecified type  Presence of stent in artery  H/O prostatectomy      FAMILY HISTORY:      SOCIAL HISTORY:  unchanged    REVIEW OF SYSTEMS:  CONSTITUTIONAL: No fever, weight loss, or fatigue  EYES: No eye pain, visual disturbances, or discharge  ENMT:  No difficulty hearing, tinnitus, vertigo; No sinus or throat pain  NECK: No pain or stiffness  RESPIRATORY: No cough, wheezing, chills or hemoptysis; No Shortness of Breath  CARDIOVASCULAR: No chest pain, palpitations, passing out, dizziness, or leg swelling  GASTROINTESTINAL: No abdominal or epigastric pain. No nausea, vomiting, or hematemesis; No diarrhea or constipation. No melena or hematochezia.  GENITOURINARY: No dysuria, frequency, hematuria, or incontinence  NEUROLOGICAL: No headaches, memory loss, loss of strength, numbness, or tremors  SKIN: No itching, burning, rashes, or lesions   LYMPH Nodes: No enlarged glands  ENDOCRINE: No heat or cold intolerance; No hair loss  MUSCULOSKELETAL: No joint pain or swelling; No muscle, back, or extremity pain  PSYCHIATRIC: No depression, anxiety, mood swings, or difficulty sleeping  HEME/LYMPH: No easy bruising, or bleeding gums  ALLERY AND IMMUNOLOGIC: No hives or eczema	    [ x] All others negative	  [ ] Unable to obtain    PHYSICAL EXAM:  T(C): 36.7 (04-17-19 @ 10:12), Max: 37.3 (04-16-19 @ 11:17)  HR: 92 (04-17-19 @ 10:12) (73 - 118)  BP: 129/109 (04-17-19 @ 10:12) (129/109 - 190/93)  RR: 18 (04-17-19 @ 10:12) (17 - 18)  SpO2: 98% (04-17-19 @ 10:12) (95% - 100%)  Wt(kg): --  I&O's Summary    16 Apr 2019 07:01  -  17 Apr 2019 07:00  --------------------------------------------------------  IN: 480 mL / OUT: 400 mL / NET: 80 mL    17 Apr 2019 07:01  -  17 Apr 2019 10:28  --------------------------------------------------------  IN: 240 mL / OUT: 0 mL / NET: 240 mL        Appearance: Normal	  HEENT:   Normal oral mucosa, PERRL, EOMI	  Carotid:  Right: No bruit    Left:  No bruit  Lymphatic: No lymphadenopathy  Cardiovascular: Normal S1 S2, No JVD, No murmurs, No edema  Respiratory: Lungs clear to auscultation	  Psychiatry: A & O x 3, Mood & affect appropriate  Gastrointestinal:  Soft, Non-tender, + BS	  Skin: No rashes, No ecchymoses, No cyanosis	  Neurologic: Non-focal  Extremities: Normal range of motion, No clubbing, cyanosis.   R Groin stable, no hematoma    LABS:	 	    CBC Full  -  ( 17 Apr 2019 00:04 )  WBC Count : 9.9 K/uL  Hemoglobin : 15.8 g/dL  Hematocrit : 46.7 %  Platelet Count - Automated : 223 K/uL  Mean Cell Volume : 90.1 fl  Mean Cell Hemoglobin : 30.4 pg  Mean Cell Hemoglobin Concentration : 33.8 gm/dL  Auto Neutrophil # : 6.4 K/uL  Auto Lymphocyte # : 1.9 K/uL  Auto Monocyte # : 1.1 K/uL  Auto Eosinophil # : 0.4 K/uL  Auto Basophil # : 0.0 K/uL  Auto Neutrophil % : 64.3 %  Auto Lymphocyte % : 19.5 %  Auto Monocyte % : 11.6 %  Auto Eosinophil % : 4.5 %  Auto Basophil % : 0.2 %    04-17    137  |  99  |  25<H>  ----------------------------<  146<H>  3.3<L>   |  27  |  1.10  04-16    135  |  96  |  26<H>  ----------------------------<  136<H>  4.8   |  25  |  1.21    Ca    9.8      17 Apr 2019 00:04  Ca    10.5      16 Apr 2019 12:03    TPro  6.8  /  Alb  3.9  /  TBili  0.7  /  DBili  x   /  AST  25  /  ALT  27  /  AlkPhos  63  04-17  TPro  8.2  /  Alb  4.5  /  TBili  0.9  /  DBili  x   /  AST  65<H>  /  ALT  34  /  AlkPhos  71  04-16          Assessment:   HLD (hyperlipidemia)  Hypertension  Atrial fibrillation, unspecified type  Presence of stent in artery  H/O prostatectomy           Plan:  1. Continue home medications  2.  Resume anticoagulation with eliquis this evening  3.  d/c home today with followup with me in 2 weeks  4.  groin precautions discussed in detail         Thank you      Vascular Cardiology Service   [id number] - [id number]  Office [phone]  email:   [email or web address]

## 2019-04-17 NOTE — DISCHARGE NOTE PROVIDER - NSDCCPTREATMENT_GEN_ALL_CORE_FT
PRINCIPAL PROCEDURE  Procedure: Catheterization of right heart with measurement of cardiac output  Findings and Treatment: RHC PCWP 10

## 2019-04-17 NOTE — DISCHARGE NOTE PROVIDER - HOSPITAL COURSE
63 year old male with a history of atrial fibrillation-on Eliquis last dose 4/14/19. , HTN, HLD, obesity, CAD with PCI 6 years ago, BRYSON-does not use CPAP presents for SADIA study for HTN.     4/16/19 s/p RHC PCWP 10 via right groin . No bleeding or hematoma noted . Pt is no longer in study at this time ok to resume all home meds as prior.  May resume Eliquis tonight     as per Dr. Barakat . 63 year old male with a history of atrial fibrillation-on Eliquis last dose 4/14/19. , HTN, HLD, obesity, CAD with PCI 6 years ago, BRYSON-does not use CPAP presents for SADIA study for HTN.     4/16/19 s/p RHC PCWP 10 via right groin . No bleeding or hematoma noted . Pt is no longer in study at this time now s/p unsuccessful RCX study via RFA ok to resume all home meds as prior.  May resume Eliquis tonight     as per Dr. Barakat .

## 2019-04-17 NOTE — DISCHARGE NOTE NURSING/CASE MANAGEMENT/SOCIAL WORK - NSDCDPATPORTLINK_GEN_ALL_CORE
You can access the [facility] Portal, offered by [facility], by registering with the following website: [email or web address]

## 2019-04-17 NOTE — DISCHARGE NOTE PROVIDER - CARE PROVIDER_API CALL
Huang Barakat (DO)  Internal Medicine; Nuclear Cardiology  [address] [place] [address]  Phone: [phone]  Fax: [phone]  Follow Up Time:

## 2019-04-17 NOTE — PROGRESS NOTE ADULT - ASSESSMENT
Patient is a 63y old  Male who presents with uncontrolled HTN now s/p unsuccessful RCX study via RFA. Pt tolerated the procedure well, site benign, discharge pending

## 2019-04-17 NOTE — DISCHARGE NOTE PROVIDER - NSDCCPCAREPLAN_GEN_ALL_CORE_FT
PRINCIPAL DISCHARGE DIAGNOSIS  Diagnosis: HTN (hypertension)  Assessment and Plan of Treatment: Continue with your blood pressure medications; eat a heart healthy diet with low salt diet; exercise regularly (consult with your physician or cardiologist first); maintain a heart healthy weight; if you smoke - quit (A resource to help you stop smoking is the [facility] – phone number [phone].); include healthy ways to manage stress. Continue to follow with your primary care physician or cardiologist.

## 2019-07-15 ENCOUNTER — RX RENEWAL (OUTPATIENT)
Age: 64
End: 2019-07-15

## 2019-09-24 ENCOUNTER — APPOINTMENT (OUTPATIENT)
Dept: CARDIOLOGY | Facility: CLINIC | Age: 64
End: 2019-09-24
Payer: MEDICAID

## 2019-09-24 VITALS
RESPIRATION RATE: 16 BRPM | BODY MASS INDEX: 30.92 KG/M2 | WEIGHT: 216 LBS | HEART RATE: 96 BPM | SYSTOLIC BLOOD PRESSURE: 150 MMHG | OXYGEN SATURATION: 95 % | DIASTOLIC BLOOD PRESSURE: 90 MMHG | HEIGHT: 70 IN

## 2019-09-24 VITALS — DIASTOLIC BLOOD PRESSURE: 96 MMHG | SYSTOLIC BLOOD PRESSURE: 156 MMHG

## 2019-09-24 PROBLEM — C61 MALIGNANT NEOPLASM OF PROSTATE: Chronic | Status: ACTIVE | Noted: 2019-04-16

## 2019-09-24 PROBLEM — I48.91 UNSPECIFIED ATRIAL FIBRILLATION: Chronic | Status: ACTIVE | Noted: 2019-04-16

## 2019-09-24 PROBLEM — I10 ESSENTIAL (PRIMARY) HYPERTENSION: Chronic | Status: ACTIVE | Noted: 2019-04-16

## 2019-09-24 PROBLEM — E78.5 HYPERLIPIDEMIA, UNSPECIFIED: Chronic | Status: ACTIVE | Noted: 2019-04-16

## 2019-09-24 PROCEDURE — 99214 OFFICE O/P EST MOD 30 MIN: CPT

## 2019-09-24 NOTE — HISTORY OF PRESENT ILLNESS
[FreeTextEntry1] : 9/24/2019\par Seen by Dr. Rodríguez yesterday\par complains of bilateral foot and ankle swelling.\par An RX was sent in for Torsemide.  \par He is wearing compression garments at times, but not daily.  \par Remains on eliquis (so doubt DVT)\par Stress in life.  Lost job and broke up with his sig other. \par Complains of a numbness of the toes "like im walking on gravel"

## 2019-09-24 NOTE — REASON FOR VISIT
[Follow-Up - Clinic] : a clinic follow-up of [FreeTextEntry2] : leg swelling, venous insufficiency, resistant HTN [FreeTextEntry1] :  63 year old male with a history of atrial fibrillation, HTN, HLD, obesity, coronary artery disease with PCI 6 years ago. \par  \par

## 2019-09-24 NOTE — ASSESSMENT
[FreeTextEntry1] : Assessment:\par 1.  Chronic Deep venous insufficiency\par - Currently well controlled and clinically insignificant\par - No edema on the right trace edema on the left\par 2.  Resistant HTN\par - on 4 agents\par - not controlled in the office. \par - no YULIA\par - no BRYSON\par 3.  Obesity\par 4.  Coronary disease with PCI\par 5.  Atrial fibrillation on A/C \par 6.  Negative BRYSON testing per patient\par 7.  Peripheral Neuropathy\par \par Plan\par 1.  Agree with  trial of diuretic to minimize edema\par 2.  Refresh compression garments\par 3.  Strong pedal pulses on exam\par 4.  History also consistent with peripheral neuropathy but prefer to avoid gabapentin due to potential side effect of edema.

## 2019-09-24 NOTE — PHYSICAL EXAM
[General Appearance - Well Developed] : well developed [Normal Appearance] : normal appearance [Well Groomed] : well groomed [General Appearance - Well Nourished] : well nourished [No Deformities] : no deformities [General Appearance - In No Acute Distress] : no acute distress [Normal Conjunctiva] : the conjunctiva exhibited no abnormalities [Eyelids - No Xanthelasma] : the eyelids demonstrated no xanthelasmas [Normal Oral Mucosa] : normal oral mucosa [No Oral Pallor] : no oral pallor [No Oral Cyanosis] : no oral cyanosis [Normal Jugular Venous A Waves Present] : normal jugular venous A waves present [Normal Jugular Venous V Waves Present] : normal jugular venous V waves present [No Jugular Venous Perdomo A Waves] : no jugular venous perdomo A waves [Respiration, Rhythm And Depth] : normal respiratory rhythm and effort [Exaggerated Use Of Accessory Muscles For Inspiration] : no accessory muscle use [Heart Rate And Rhythm] : heart rate and rhythm were normal [Auscultation Breath Sounds / Voice Sounds] : lungs were clear to auscultation bilaterally [Heart Sounds] : normal S1 and S2 [Murmurs] : no murmurs present [Abdomen Soft] : soft [Abdomen Tenderness] : non-tender [Abdomen Mass (___ Cm)] : no abdominal mass palpated [Abnormal Walk] : normal gait [Gait - Sufficient For Exercise Testing] : the gait was sufficient for exercise testing [Skin Color & Pigmentation] : normal skin color and pigmentation [] : no rash [No Venous Stasis] : no venous stasis [Skin Lesions] : no skin lesions [No Skin Ulcers] : no skin ulcer [No Xanthoma] : no  xanthoma was observed [Oriented To Time, Place, And Person] : oriented to person, place, and time [Affect] : the affect was normal [Mood] : the mood was normal [No Anxiety] : not feeling anxious [FreeTextEntry1] :  Bilateral LE edema .  strong pedal pulses

## 2019-12-27 ENCOUNTER — TRANSCRIPTION ENCOUNTER (OUTPATIENT)
Age: 64
End: 2019-12-27

## 2020-01-14 ENCOUNTER — APPOINTMENT (OUTPATIENT)
Dept: CARDIOLOGY | Facility: CLINIC | Age: 65
End: 2020-01-14
Payer: MEDICAID

## 2020-01-14 VITALS
BODY MASS INDEX: 30.49 KG/M2 | DIASTOLIC BLOOD PRESSURE: 90 MMHG | HEIGHT: 70 IN | RESPIRATION RATE: 16 BRPM | WEIGHT: 213 LBS | OXYGEN SATURATION: 95 % | SYSTOLIC BLOOD PRESSURE: 140 MMHG | HEART RATE: 100 BPM

## 2020-01-14 PROCEDURE — 99214 OFFICE O/P EST MOD 30 MIN: CPT

## 2020-01-14 NOTE — HISTORY OF PRESENT ILLNESS
[FreeTextEntry1] : 1/14/2020\par Feet feel like "i am walking on stones"\par Throbbing, tingling\par Edema is there but controlled with compression garments.\par We reviewed his vascular imaging together - he has normal, pulsatile ABIs and no DVT\par He is wearing compression\par Sometimes his feet turn a dark purple or blue with dependancy.  In the office i elevated his legs and this discoloration resolved\par Strong pedal pulses on exam.  \par Diminished sensation.

## 2020-01-14 NOTE — ASSESSMENT
[FreeTextEntry1] : Assessment:\par 1.  Chronic Deep venous insufficiency\par - Currently well controlled and clinically insignificant\par - No edema on the right trace edema on the left\par 2.  Resistant HTN\par - on 4 agents\par - not controlled in the office. \par - no YULIA\par - no BRYSON\par 3.  Obesity\par 4.  Coronary disease with PCI\par 5.  Atrial fibrillation on A/C \par 6.  Negative BRYSON testing per patient\par 7.  Peripheral Neuropathy\par \par Plan\par 1.  Continue compression as prescribed\par 2.  He should see a neurologist for evaluation of peripheral neuropathy\par 3.  no suggestion of arterial disease\par 4.  Return PRN

## 2020-01-14 NOTE — REASON FOR VISIT
[Follow-Up - Clinic] : a clinic follow-up of [FreeTextEntry1] :  63 year old male with a history of atrial fibrillation, HTN, HLD, obesity, coronary artery disease with PCI 6 years ago. \par  \par  [FreeTextEntry2] : leg swelling, venous insufficiency, resistant HTN

## 2020-01-14 NOTE — PHYSICAL EXAM
[General Appearance - Well Developed] : well developed [Normal Appearance] : normal appearance [Well Groomed] : well groomed [No Deformities] : no deformities [General Appearance - Well Nourished] : well nourished [General Appearance - In No Acute Distress] : no acute distress [Normal Conjunctiva] : the conjunctiva exhibited no abnormalities [Eyelids - No Xanthelasma] : the eyelids demonstrated no xanthelasmas [Normal Oral Mucosa] : normal oral mucosa [No Oral Pallor] : no oral pallor [No Oral Cyanosis] : no oral cyanosis [Normal Jugular Venous A Waves Present] : normal jugular venous A waves present [No Jugular Venous Perdomo A Waves] : no jugular venous perdomo A waves [Normal Jugular Venous V Waves Present] : normal jugular venous V waves present [Respiration, Rhythm And Depth] : normal respiratory rhythm and effort [Auscultation Breath Sounds / Voice Sounds] : lungs were clear to auscultation bilaterally [Exaggerated Use Of Accessory Muscles For Inspiration] : no accessory muscle use [Heart Rate And Rhythm] : heart rate and rhythm were normal [Heart Sounds] : normal S1 and S2 [Murmurs] : no murmurs present [Abdomen Tenderness] : non-tender [Abdomen Soft] : soft [Abdomen Mass (___ Cm)] : no abdominal mass palpated [Abnormal Walk] : normal gait [Gait - Sufficient For Exercise Testing] : the gait was sufficient for exercise testing [] : no rash [FreeTextEntry1] :  Bilateral LE edema .  strong pedal pulses [Skin Color & Pigmentation] : normal skin color and pigmentation [No Venous Stasis] : no venous stasis [Skin Lesions] : no skin lesions [No Skin Ulcers] : no skin ulcer [No Xanthoma] : no  xanthoma was observed [Oriented To Time, Place, And Person] : oriented to person, place, and time [Affect] : the affect was normal [Mood] : the mood was normal [No Anxiety] : not feeling anxious

## 2020-04-07 ENCOUNTER — OUTPATIENT (OUTPATIENT)
Dept: OUTPATIENT SERVICES | Facility: HOSPITAL | Age: 65
LOS: 1 days | End: 2020-04-07
Payer: MEDICAID

## 2020-04-07 ENCOUNTER — APPOINTMENT (OUTPATIENT)
Dept: CT IMAGING | Facility: CLINIC | Age: 65
End: 2020-04-07
Payer: MEDICAID

## 2020-04-07 DIAGNOSIS — N21.0 CALCULUS IN BLADDER: ICD-10-CM

## 2020-04-07 DIAGNOSIS — Z90.79 ACQUIRED ABSENCE OF OTHER GENITAL ORGAN(S): Chronic | ICD-10-CM

## 2020-04-07 DIAGNOSIS — Z95.828 PRESENCE OF OTHER VASCULAR IMPLANTS AND GRAFTS: Chronic | ICD-10-CM

## 2020-04-07 PROCEDURE — 82565 ASSAY OF CREATININE: CPT

## 2020-04-07 PROCEDURE — 74178 CT ABD&PLV WO CNTR FLWD CNTR: CPT

## 2020-04-07 PROCEDURE — 74178 CT ABD&PLV WO CNTR FLWD CNTR: CPT | Mod: 26

## 2020-06-30 ENCOUNTER — APPOINTMENT (OUTPATIENT)
Dept: CARDIOLOGY | Facility: CLINIC | Age: 65
End: 2020-06-30

## 2020-08-02 ENCOUNTER — RX RENEWAL (OUTPATIENT)
Age: 65
End: 2020-08-02

## 2020-08-11 ENCOUNTER — APPOINTMENT (OUTPATIENT)
Dept: CARDIOLOGY | Facility: CLINIC | Age: 65
End: 2020-08-11
Payer: MEDICAID

## 2020-08-11 PROCEDURE — 99214 OFFICE O/P EST MOD 30 MIN: CPT

## 2020-08-13 VITALS
HEART RATE: 102 BPM | SYSTOLIC BLOOD PRESSURE: 136 MMHG | HEIGHT: 70 IN | DIASTOLIC BLOOD PRESSURE: 90 MMHG | RESPIRATION RATE: 16 BRPM | OXYGEN SATURATION: 98 %

## 2020-08-13 NOTE — ASSESSMENT
[FreeTextEntry1] : Assessment:\par 1.  Chronic Deep venous insufficiency\par  2.   HTN\par - on 4 agents\par -  controlled in the office. \par - no YULIA\par - no BRYSON\par 3.  Obesity\par 4.  Coronary disease with PCI\par 5.  Atrial fibrillation on A/C \par 6.  Negative BRYSON testing per patient\par 7.  Peripheral Neuropathy\par \par Plan\par 1.  Continue compression as prescribed\par 2.  He should see a neurologist for evaluation of peripheral neuropathy\par 3.  No suggestion of signficant arterial insufficiency - he has bounding pedal pulses. \par 4.  Trial of Torsemide 10mg PO daily x 1 month for edema control.  Avoidance of NSAIDs given edema and difficult to control BP\par 5.  Podiatry management of L foot fracture

## 2020-08-13 NOTE — HISTORY OF PRESENT ILLNESS
[FreeTextEntry1] : 8/11/2020\par \par Here for followup for leg swelling.  \par He describes peripheral neuropathic sensation on the bottoms of the feet (stable and ongoing)\par has not seen a neurologist for this (was recommended last visit)\par Unfortunately he was lifting weights and the dumbell fell on his L foot - states he has a fracture.  He is in a boot.  dr. Galan (podiatry)\par \par He is taking Ibuprofen for he pain ( i asked him to stop this and transition to Tylenol given his history of edema and HTN\par \par He is currently on \par Losartan 100\par HCTZ 25\par Aspirin 81\par toprol \par Atorvastatin\par Clonidine 0.3\par Eliquis 5mg BID\par \par His BP is well controlled today at 136/90\par He was previously prescribed Torsemide 10mg PO daily for leg swelling which helped.  Currenly off torsemdie.

## 2020-08-13 NOTE — PHYSICAL EXAM
[General Appearance - Well Developed] : well developed [Normal Appearance] : normal appearance [General Appearance - Well Nourished] : well nourished [Well Groomed] : well groomed [General Appearance - In No Acute Distress] : no acute distress [No Deformities] : no deformities [Normal Conjunctiva] : the conjunctiva exhibited no abnormalities [Eyelids - No Xanthelasma] : the eyelids demonstrated no xanthelasmas [No Oral Pallor] : no oral pallor [Normal Oral Mucosa] : normal oral mucosa [No Oral Cyanosis] : no oral cyanosis [Normal Jugular Venous A Waves Present] : normal jugular venous A waves present [No Jugular Venous Perdomo A Waves] : no jugular venous perdomo A waves [Normal Jugular Venous V Waves Present] : normal jugular venous V waves present [Respiration, Rhythm And Depth] : normal respiratory rhythm and effort [Exaggerated Use Of Accessory Muscles For Inspiration] : no accessory muscle use [Auscultation Breath Sounds / Voice Sounds] : lungs were clear to auscultation bilaterally [Heart Rate And Rhythm] : heart rate and rhythm were normal [Heart Sounds] : normal S1 and S2 [Murmurs] : no murmurs present [Abdomen Soft] : soft [Abdomen Tenderness] : non-tender [Abdomen Mass (___ Cm)] : no abdominal mass palpated [Abnormal Walk] : normal gait [Skin Color & Pigmentation] : normal skin color and pigmentation [Gait - Sufficient For Exercise Testing] : the gait was sufficient for exercise testing [No Venous Stasis] : no venous stasis [] : no rash [Skin Lesions] : no skin lesions [No Skin Ulcers] : no skin ulcer [No Xanthoma] : no  xanthoma was observed [Oriented To Time, Place, And Person] : oriented to person, place, and time [Mood] : the mood was normal [Affect] : the affect was normal [No Anxiety] : not feeling anxious [FreeTextEntry1] :  Bilateral LE edema .  strong pedal pulses.  L foot in a boot.

## 2020-08-13 NOTE — REASON FOR VISIT
[Follow-Up - Clinic] : a clinic follow-up of [FreeTextEntry2] : leg swelling, venous insufficiency, resistant HTN [FreeTextEntry1] :  63 year old male with a history of atrial fibrillation, HTN, HLD, obesity, coronary artery disease with PCI\par  \par

## 2020-11-17 ENCOUNTER — APPOINTMENT (OUTPATIENT)
Dept: CARDIOLOGY | Facility: CLINIC | Age: 65
End: 2020-11-17

## 2020-12-01 ENCOUNTER — APPOINTMENT (OUTPATIENT)
Dept: CARDIOLOGY | Facility: CLINIC | Age: 65
End: 2020-12-01
Payer: MEDICAID

## 2020-12-01 VITALS
HEART RATE: 75 BPM | HEIGHT: 70 IN | WEIGHT: 218 LBS | RESPIRATION RATE: 16 BRPM | DIASTOLIC BLOOD PRESSURE: 80 MMHG | BODY MASS INDEX: 31.21 KG/M2 | OXYGEN SATURATION: 98 % | SYSTOLIC BLOOD PRESSURE: 140 MMHG

## 2020-12-01 PROCEDURE — 99214 OFFICE O/P EST MOD 30 MIN: CPT

## 2020-12-07 ENCOUNTER — RX RENEWAL (OUTPATIENT)
Age: 65
End: 2020-12-07

## 2020-12-07 NOTE — ASSESSMENT
[FreeTextEntry1] : Assessment:\par 1.  Chronic Deep venous insufficiency\par  2.   HTN\par -  controlled in the office. \par - no YULIA\par - no BRYSON\par 3.  Obesity\par 4.  Coronary disease with PCI\par 5.  Atrial fibrillation on A/C \par 6.  Negative BRYSON testing per patient\par 7.  Peripheral Neuropathy\par \par Plan\par 1.  Continue compression as prescribed\par 2.  He should see a neurologist for evaluation of peripheral neuropathy\par 3.  No suggestion of signficant arterial insufficiency - he has bounding pedal pulses. \par 4.   Wound-care was performed in the office to the left leg.  Was cleansed with saline, Xeroform dressing was applied to the shin/ ulcer.  Leg was wrapped with kerlix cling and then ACe wrap was placed.  He will see wound care in 2 days\par 5.  Weight loss, diet and exercise\par 6.  Return in 6 months.

## 2020-12-07 NOTE — PHYSICAL EXAM
[General Appearance - Well Developed] : well developed [Normal Appearance] : normal appearance [Well Groomed] : well groomed [General Appearance - Well Nourished] : well nourished [No Deformities] : no deformities [General Appearance - In No Acute Distress] : no acute distress [Normal Conjunctiva] : the conjunctiva exhibited no abnormalities [Eyelids - No Xanthelasma] : the eyelids demonstrated no xanthelasmas [Normal Oral Mucosa] : normal oral mucosa [No Oral Pallor] : no oral pallor [No Oral Cyanosis] : no oral cyanosis [Normal Jugular Venous A Waves Present] : normal jugular venous A waves present [Normal Jugular Venous V Waves Present] : normal jugular venous V waves present [No Jugular Venous Perdomo A Waves] : no jugular venous perdomo A waves [Respiration, Rhythm And Depth] : normal respiratory rhythm and effort [Exaggerated Use Of Accessory Muscles For Inspiration] : no accessory muscle use [Auscultation Breath Sounds / Voice Sounds] : lungs were clear to auscultation bilaterally [Heart Rate And Rhythm] : heart rate and rhythm were normal [Heart Sounds] : normal S1 and S2 [Murmurs] : no murmurs present [Abdomen Soft] : soft [Abdomen Tenderness] : non-tender [Abdomen Mass (___ Cm)] : no abdominal mass palpated [Abnormal Walk] : normal gait [Gait - Sufficient For Exercise Testing] : the gait was sufficient for exercise testing [Skin Color & Pigmentation] : normal skin color and pigmentation [] : no rash [No Venous Stasis] : no venous stasis [Skin Lesions] : no skin lesions [No Skin Ulcers] : no skin ulcer [No Xanthoma] : no  xanthoma was observed [Oriented To Time, Place, And Person] : oriented to person, place, and time [Affect] : the affect was normal [Mood] : the mood was normal [No Anxiety] : not feeling anxious [FreeTextEntry1] :  Bilateral LE edema .  strong pedal pulses.  L shin ulceration and water blister.

## 2020-12-07 NOTE — HISTORY OF PRESENT ILLNESS
[FreeTextEntry1] : 12/1/2020\par He has a left shin ulceration and water blister.  \par Seeing wound care next week\par He is wearing compression garments, but not today.\par \par He describes peripheral neuropathic sensation on the bottoms of the feet (stable and ongoing)\par \par Breathing ok \par Not working, laid off   \par Was at [facility] last month for 8 days, fell off a chair.  Hit his hear and back.  fractured rib.

## 2021-07-19 ENCOUNTER — RX RENEWAL (OUTPATIENT)
Age: 66
End: 2021-07-19

## 2021-09-21 ENCOUNTER — APPOINTMENT (OUTPATIENT)
Dept: CARDIOLOGY | Facility: CLINIC | Age: 66
End: 2021-09-21

## 2021-11-09 ENCOUNTER — APPOINTMENT (OUTPATIENT)
Dept: CARDIOLOGY | Facility: CLINIC | Age: 66
End: 2021-11-09
Payer: MEDICARE

## 2021-11-09 PROCEDURE — 99214 OFFICE O/P EST MOD 30 MIN: CPT

## 2021-11-09 RX ORDER — TORSEMIDE 10 MG/1
10 TABLET ORAL DAILY
Qty: 90 | Refills: 3 | Status: ACTIVE | COMMUNITY
Start: 2020-08-11 | End: 1900-01-01

## 2021-11-12 NOTE — HISTORY OF PRESENT ILLNESS
[FreeTextEntry1] : 11/9/2021\par \par Legs are very edematous\par He is off his diuretic\par no cp or SOB\par small ulceration on the L anterior shin , very superficial\par zIa 8/2/2021 - negative for DVT\par remains on eliquis, metoprolol, aspirin, losartan HCTZ 100-25, clonadin, atorva 40\par Complains of L foot pain, tender to palpation - reports history of prior fracture of the foot\par

## 2021-11-12 NOTE — PHYSICAL EXAM
[General Appearance - Well Developed] : well developed [Normal Appearance] : normal appearance [Well Groomed] : well groomed [General Appearance - Well Nourished] : well nourished [No Deformities] : no deformities [General Appearance - In No Acute Distress] : no acute distress [Normal Conjunctiva] : the conjunctiva exhibited no abnormalities [Eyelids - No Xanthelasma] : the eyelids demonstrated no xanthelasmas [Normal Oral Mucosa] : normal oral mucosa [No Oral Pallor] : no oral pallor [No Oral Cyanosis] : no oral cyanosis [Normal Jugular Venous A Waves Present] : normal jugular venous A waves present [Normal Jugular Venous V Waves Present] : normal jugular venous V waves present [No Jugular Venous Perdomo A Waves] : no jugular venous perdomo A waves [Respiration, Rhythm And Depth] : normal respiratory rhythm and effort [Exaggerated Use Of Accessory Muscles For Inspiration] : no accessory muscle use [Auscultation Breath Sounds / Voice Sounds] : lungs were clear to auscultation bilaterally [Heart Rate And Rhythm] : heart rate and rhythm were normal [Heart Sounds] : normal S1 and S2 [Murmurs] : no murmurs present [Abdomen Soft] : soft [Abdomen Tenderness] : non-tender [Abdomen Mass (___ Cm)] : no abdominal mass palpated [Skin Color & Pigmentation] : normal skin color and pigmentation [] : no rash [No Venous Stasis] : no venous stasis [Skin Lesions] : no skin lesions [No Skin Ulcers] : no skin ulcer [No Xanthoma] : no  xanthoma was observed [Oriented To Time, Place, And Person] : oriented to person, place, and time [Affect] : the affect was normal [Mood] : the mood was normal [No Anxiety] : not feeling anxious [FreeTextEntry1] :  Bilateral LE edema .  strong pedal pulses.  L shin ulceration .  L  to palp

## 2021-11-12 NOTE — ASSESSMENT
[FreeTextEntry1] : Assessment:\par 1.  Chronic Deep venous insufficiency\par  2.   HTN\par - no YULIA\par - no BRYSON\par 3.  Obesity\par 4.  Coronary disease with PCI\par 5.  Atrial fibrillation on A/C \par 6.  Negative BRYSON testing per patient\par 7.  Peripheral Neuropathy\par \par Plan\par 1. Very edematous on exam, d/w Dr. Rodríguez, will start torsemide 10mg daily \par 2.  Weight loss\par 3.  See podiatry for LEFT foot tenderness and pain in the metatarsal area\par 4.  Return in 1 week to re-assess

## 2022-01-18 ENCOUNTER — APPOINTMENT (OUTPATIENT)
Dept: CARDIOLOGY | Facility: CLINIC | Age: 67
End: 2022-01-18
Payer: MEDICARE

## 2022-01-18 PROCEDURE — 99214 OFFICE O/P EST MOD 30 MIN: CPT

## 2022-01-18 RX ORDER — CARVEDILOL 12.5 MG/1
12.5 TABLET, FILM COATED ORAL TWICE DAILY
Qty: 180 | Refills: 3 | Status: ACTIVE | COMMUNITY
Start: 2022-01-18 | End: 1900-01-01

## 2022-01-18 NOTE — ASSESSMENT
[FreeTextEntry1] : Assessment:\par 1.  Chronic Deep venous insufficiency\par  2.   HTN\par - no YULIA\par - no BRYSON\par 3.  Obesity\par 4.  Coronary disease with PCI\par 5.  Atrial fibrillation on A/C \par 6.  Negative BRYSON testing per patient\par 7.  Peripheral Neuropathy\par \par Plan\par 1.  Leg swelling is better controlled\par 2.  Continue torsemide 10mg daily - will arrange for labwork to assure creatinine ok \par 3.  Can take an extra dose of torsemide as needed for worsening edema\par 4.  BP elevated.  Will stop metoprolol and start coreg 12.5mg BID .  Phone call in 1 week to see how his BP is.  \par 5.  Moisturizer to the feet and legs daily \par 6.  Return in 3 months. \par \par d/w Dr. Rodríguez

## 2022-01-18 NOTE — PHYSICAL EXAM
[General Appearance - Well Developed] : well developed [Normal Appearance] : normal appearance [Well Groomed] : well groomed [General Appearance - Well Nourished] : well nourished [No Deformities] : no deformities [General Appearance - In No Acute Distress] : no acute distress [Normal Conjunctiva] : the conjunctiva exhibited no abnormalities [Eyelids - No Xanthelasma] : the eyelids demonstrated no xanthelasmas [Normal Oral Mucosa] : normal oral mucosa [No Oral Pallor] : no oral pallor [No Oral Cyanosis] : no oral cyanosis [Normal Jugular Venous A Waves Present] : normal jugular venous A waves present [Normal Jugular Venous V Waves Present] : normal jugular venous V waves present [No Jugular Venous Perdomo A Waves] : no jugular venous perdomo A waves [Respiration, Rhythm And Depth] : normal respiratory rhythm and effort [Exaggerated Use Of Accessory Muscles For Inspiration] : no accessory muscle use [Auscultation Breath Sounds / Voice Sounds] : lungs were clear to auscultation bilaterally [Heart Rate And Rhythm] : heart rate and rhythm were normal [Heart Sounds] : normal S1 and S2 [Murmurs] : no murmurs present [Abdomen Soft] : soft [Abdomen Tenderness] : non-tender [Abdomen Mass (___ Cm)] : no abdominal mass palpated [Skin Color & Pigmentation] : normal skin color and pigmentation [] : no rash [No Venous Stasis] : no venous stasis [Skin Lesions] : no skin lesions [No Skin Ulcers] : no skin ulcer [No Xanthoma] : no  xanthoma was observed [Oriented To Time, Place, And Person] : oriented to person, place, and time [Affect] : the affect was normal [Mood] : the mood was normal [No Anxiety] : not feeling anxious [FreeTextEntry1] : Trace edema .  strong pedal pulses.  L shin ulceration .
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
## 2022-02-09 ENCOUNTER — NON-APPOINTMENT (OUTPATIENT)
Age: 67
End: 2022-02-09

## 2022-02-11 ENCOUNTER — NON-APPOINTMENT (OUTPATIENT)
Age: 67
End: 2022-02-11

## 2022-02-23 ENCOUNTER — NON-APPOINTMENT (OUTPATIENT)
Age: 67
End: 2022-02-23

## 2022-05-10 ENCOUNTER — APPOINTMENT (OUTPATIENT)
Dept: CARDIOLOGY | Facility: CLINIC | Age: 67
End: 2022-05-10
Payer: MEDICARE

## 2022-05-10 PROCEDURE — 99213 OFFICE O/P EST LOW 20 MIN: CPT

## 2022-05-19 NOTE — ASSESSMENT
[FreeTextEntry1] : Assessment:\par 1.  Chronic Deep venous insufficiency\par  2.   HTN\par - no YULIA\par - no BRYSON\par 3.  Obesity\par 4.  Coronary disease with PCI\par 5.  Atrial fibrillation on A/C \par 6.  Negative BRYSON testing per patient\par 7.  Peripheral Neuropathy\par \par Plan\par 1.  Leg swelling is better controlled\par 2.  Continue torsemide  \par 3.  Can take an extra dose of torsemide as needed for worsening edema\par 4.  BP controlled   \par 5.  Moisturizer to the feet and legs daily \par 6.  Return in 3 -6 months. \par \par
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
## 2022-08-12 ENCOUNTER — RX RENEWAL (OUTPATIENT)
Age: 67
End: 2022-08-12

## 2022-08-12 RX ORDER — MIRABEGRON 50 MG/1
50 TABLET, FILM COATED, EXTENDED RELEASE ORAL
Qty: 30 | Refills: 3 | Status: ACTIVE | COMMUNITY
Start: 2022-04-22 | End: 1900-01-01

## 2022-10-09 ENCOUNTER — INPATIENT (INPATIENT)
Facility: HOSPITAL | Age: 67
LOS: 5 days | Discharge: HOME | DRG: 603 | End: 2022-10-15
Attending: INTERNAL MEDICINE | Admitting: STUDENT IN AN ORGANIZED HEALTH CARE EDUCATION/TRAINING PROGRAM
Payer: MEDICARE

## 2022-10-09 VITALS
SYSTOLIC BLOOD PRESSURE: 180 MMHG | WEIGHT: 205.03 LBS | TEMPERATURE: 99 F | HEIGHT: 70 IN | OXYGEN SATURATION: 97 % | HEART RATE: 88 BPM | DIASTOLIC BLOOD PRESSURE: 98 MMHG | RESPIRATION RATE: 18 BRPM

## 2022-10-09 DIAGNOSIS — Z90.79 ACQUIRED ABSENCE OF OTHER GENITAL ORGAN(S): Chronic | ICD-10-CM

## 2022-10-09 DIAGNOSIS — Z95.828 PRESENCE OF OTHER VASCULAR IMPLANTS AND GRAFTS: Chronic | ICD-10-CM

## 2022-10-09 DIAGNOSIS — E87.6 HYPOKALEMIA: ICD-10-CM

## 2022-10-09 DIAGNOSIS — Z29.9 ENCOUNTER FOR PROPHYLACTIC MEASURES, UNSPECIFIED: ICD-10-CM

## 2022-10-09 DIAGNOSIS — L03.116 CELLULITIS OF LEFT LOWER LIMB: ICD-10-CM

## 2022-10-09 DIAGNOSIS — I48.20 CHRONIC ATRIAL FIBRILLATION, UNSPECIFIED: ICD-10-CM

## 2022-10-09 DIAGNOSIS — I10 ESSENTIAL (PRIMARY) HYPERTENSION: ICD-10-CM

## 2022-10-09 DIAGNOSIS — E78.5 HYPERLIPIDEMIA, UNSPECIFIED: ICD-10-CM

## 2022-10-09 LAB
ALBUMIN SERPL ELPH-MCNC: 4 G/DL — SIGNIFICANT CHANGE UP (ref 3.3–5)
ALP SERPL-CCNC: 83 U/L — SIGNIFICANT CHANGE UP (ref 40–120)
ALT FLD-CCNC: 35 U/L — SIGNIFICANT CHANGE UP (ref 12–78)
ANION GAP SERPL CALC-SCNC: 9 MMOL/L — SIGNIFICANT CHANGE UP (ref 5–17)
AST SERPL-CCNC: 20 U/L — SIGNIFICANT CHANGE UP (ref 15–37)
BASOPHILS # BLD AUTO: 0.05 K/UL — SIGNIFICANT CHANGE UP (ref 0–0.2)
BASOPHILS NFR BLD AUTO: 0.5 % — SIGNIFICANT CHANGE UP (ref 0–2)
BILIRUB SERPL-MCNC: 1.1 MG/DL — SIGNIFICANT CHANGE UP (ref 0.2–1.2)
BUN SERPL-MCNC: 23 MG/DL — SIGNIFICANT CHANGE UP (ref 7–23)
CALCIUM SERPL-MCNC: 9.5 MG/DL — SIGNIFICANT CHANGE UP (ref 8.5–10.1)
CHLORIDE SERPL-SCNC: 105 MMOL/L — SIGNIFICANT CHANGE UP (ref 96–108)
CO2 SERPL-SCNC: 29 MMOL/L — SIGNIFICANT CHANGE UP (ref 22–31)
CREAT SERPL-MCNC: 1.2 MG/DL — SIGNIFICANT CHANGE UP (ref 0.5–1.3)
CRP SERPL-MCNC: 6 MG/L — HIGH
EGFR: 66 ML/MIN/1.73M2 — SIGNIFICANT CHANGE UP
EOSINOPHIL # BLD AUTO: 0.17 K/UL — SIGNIFICANT CHANGE UP (ref 0–0.5)
EOSINOPHIL NFR BLD AUTO: 1.6 % — SIGNIFICANT CHANGE UP (ref 0–6)
ERYTHROCYTE [SEDIMENTATION RATE] IN BLOOD: 21 MM/HR — HIGH (ref 0–20)
GLUCOSE SERPL-MCNC: 92 MG/DL — SIGNIFICANT CHANGE UP (ref 70–99)
HCT VFR BLD CALC: 41.1 % — SIGNIFICANT CHANGE UP (ref 39–50)
HGB BLD-MCNC: 14.3 G/DL — SIGNIFICANT CHANGE UP (ref 13–17)
IMM GRANULOCYTES NFR BLD AUTO: 0.4 % — SIGNIFICANT CHANGE UP (ref 0–0.9)
LACTATE SERPL-SCNC: 3.2 MMOL/L — HIGH (ref 0.7–2)
LYMPHOCYTES # BLD AUTO: 1.55 K/UL — SIGNIFICANT CHANGE UP (ref 1–3.3)
LYMPHOCYTES # BLD AUTO: 14.7 % — SIGNIFICANT CHANGE UP (ref 13–44)
MCHC RBC-ENTMCNC: 31.2 PG — SIGNIFICANT CHANGE UP (ref 27–34)
MCHC RBC-ENTMCNC: 34.8 GM/DL — SIGNIFICANT CHANGE UP (ref 32–36)
MCV RBC AUTO: 89.7 FL — SIGNIFICANT CHANGE UP (ref 80–100)
MONOCYTES # BLD AUTO: 0.91 K/UL — HIGH (ref 0–0.9)
MONOCYTES NFR BLD AUTO: 8.7 % — SIGNIFICANT CHANGE UP (ref 2–14)
NEUTROPHILS # BLD AUTO: 7.8 K/UL — HIGH (ref 1.8–7.4)
NEUTROPHILS NFR BLD AUTO: 74.1 % — SIGNIFICANT CHANGE UP (ref 43–77)
NRBC # BLD: 0 /100 WBCS — SIGNIFICANT CHANGE UP (ref 0–0)
PLATELET # BLD AUTO: 259 K/UL — SIGNIFICANT CHANGE UP (ref 150–400)
POTASSIUM SERPL-MCNC: 3.4 MMOL/L — LOW (ref 3.5–5.3)
POTASSIUM SERPL-SCNC: 3.4 MMOL/L — LOW (ref 3.5–5.3)
PROT SERPL-MCNC: 8.4 G/DL — HIGH (ref 6–8.3)
RBC # BLD: 4.58 M/UL — SIGNIFICANT CHANGE UP (ref 4.2–5.8)
RBC # FLD: 13.5 % — SIGNIFICANT CHANGE UP (ref 10.3–14.5)
SARS-COV-2 RNA SPEC QL NAA+PROBE: SIGNIFICANT CHANGE UP
SODIUM SERPL-SCNC: 143 MMOL/L — SIGNIFICANT CHANGE UP (ref 135–145)
WBC # BLD: 10.52 K/UL — HIGH (ref 3.8–10.5)
WBC # FLD AUTO: 10.52 K/UL — HIGH (ref 3.8–10.5)

## 2022-10-09 PROCEDURE — 73590 X-RAY EXAM OF LOWER LEG: CPT | Mod: 26,LT

## 2022-10-09 PROCEDURE — 99285 EMERGENCY DEPT VISIT HI MDM: CPT

## 2022-10-09 PROCEDURE — 99222 1ST HOSP IP/OBS MODERATE 55: CPT | Mod: GC

## 2022-10-09 PROCEDURE — 93971 EXTREMITY STUDY: CPT | Mod: 26,LT

## 2022-10-09 RX ORDER — ACETAMINOPHEN 500 MG
650 TABLET ORAL EVERY 6 HOURS
Refills: 0 | Status: DISCONTINUED | OUTPATIENT
Start: 2022-10-09 | End: 2022-10-15

## 2022-10-09 RX ORDER — VANCOMYCIN HCL 1 G
1000 VIAL (EA) INTRAVENOUS ONCE
Refills: 0 | Status: COMPLETED | OUTPATIENT
Start: 2022-10-09 | End: 2022-10-09

## 2022-10-09 RX ORDER — METOPROLOL TARTRATE 50 MG
1 TABLET ORAL
Qty: 0 | Refills: 0 | DISCHARGE

## 2022-10-09 RX ORDER — APIXABAN 2.5 MG/1
1 TABLET, FILM COATED ORAL
Qty: 0 | Refills: 0 | DISCHARGE

## 2022-10-09 RX ORDER — ATORVASTATIN CALCIUM 80 MG/1
1 TABLET, FILM COATED ORAL
Qty: 0 | Refills: 0 | DISCHARGE

## 2022-10-09 RX ORDER — LOSARTAN/HYDROCHLOROTHIAZIDE 100MG-25MG
1 TABLET ORAL
Qty: 0 | Refills: 0 | DISCHARGE

## 2022-10-09 RX ORDER — VANCOMYCIN HCL 1 G
1250 VIAL (EA) INTRAVENOUS EVERY 12 HOURS
Refills: 0 | Status: DISCONTINUED | OUTPATIENT
Start: 2022-10-10 | End: 2022-10-10

## 2022-10-09 RX ORDER — ASPIRIN/CALCIUM CARB/MAGNESIUM 324 MG
81 TABLET ORAL DAILY
Refills: 0 | Status: DISCONTINUED | OUTPATIENT
Start: 2022-10-09 | End: 2022-10-15

## 2022-10-09 RX ORDER — VANCOMYCIN HCL 1 G
1250 VIAL (EA) INTRAVENOUS EVERY 12 HOURS
Refills: 0 | Status: DISCONTINUED | OUTPATIENT
Start: 2022-10-09 | End: 2022-10-09

## 2022-10-09 RX ORDER — LANOLIN ALCOHOL/MO/W.PET/CERES
3 CREAM (GRAM) TOPICAL AT BEDTIME
Refills: 0 | Status: DISCONTINUED | OUTPATIENT
Start: 2022-10-09 | End: 2022-10-15

## 2022-10-09 RX ORDER — PIPERACILLIN AND TAZOBACTAM 4; .5 G/20ML; G/20ML
3.38 INJECTION, POWDER, LYOPHILIZED, FOR SOLUTION INTRAVENOUS EVERY 8 HOURS
Refills: 0 | Status: DISCONTINUED | OUTPATIENT
Start: 2022-10-10 | End: 2022-10-14

## 2022-10-09 RX ORDER — PREGABALIN 225 MG/1
1 CAPSULE ORAL
Qty: 0 | Refills: 0 | DISCHARGE

## 2022-10-09 RX ORDER — APIXABAN 2.5 MG/1
5 TABLET, FILM COATED ORAL EVERY 12 HOURS
Refills: 0 | Status: DISCONTINUED | OUTPATIENT
Start: 2022-10-09 | End: 2022-10-15

## 2022-10-09 RX ORDER — PYRIDOXINE HCL (VITAMIN B6) 100 MG
1 TABLET ORAL
Qty: 0 | Refills: 0 | DISCHARGE

## 2022-10-09 RX ORDER — PIPERACILLIN AND TAZOBACTAM 4; .5 G/20ML; G/20ML
3.38 INJECTION, POWDER, LYOPHILIZED, FOR SOLUTION INTRAVENOUS ONCE
Refills: 0 | Status: COMPLETED | OUTPATIENT
Start: 2022-10-09 | End: 2022-10-09

## 2022-10-09 RX ORDER — HYDROCHLOROTHIAZIDE 25 MG
25 TABLET ORAL DAILY
Refills: 0 | Status: DISCONTINUED | OUTPATIENT
Start: 2022-10-09 | End: 2022-10-15

## 2022-10-09 RX ORDER — CHOLECALCIFEROL (VITAMIN D3) 125 MCG
0 CAPSULE ORAL
Qty: 0 | Refills: 0 | DISCHARGE

## 2022-10-09 RX ORDER — LOSARTAN POTASSIUM 100 MG/1
100 TABLET, FILM COATED ORAL DAILY
Refills: 0 | Status: DISCONTINUED | OUTPATIENT
Start: 2022-10-09 | End: 2022-10-15

## 2022-10-09 RX ORDER — VITAMIN E 100 UNIT
1 CAPSULE ORAL
Qty: 0 | Refills: 0 | DISCHARGE

## 2022-10-09 RX ORDER — ASPIRIN/CALCIUM CARB/MAGNESIUM 324 MG
1 TABLET ORAL
Qty: 0 | Refills: 0 | DISCHARGE

## 2022-10-09 RX ORDER — ONDANSETRON 8 MG/1
4 TABLET, FILM COATED ORAL EVERY 8 HOURS
Refills: 0 | Status: DISCONTINUED | OUTPATIENT
Start: 2022-10-09 | End: 2022-10-15

## 2022-10-09 RX ORDER — ATORVASTATIN CALCIUM 80 MG/1
40 TABLET, FILM COATED ORAL AT BEDTIME
Refills: 0 | Status: DISCONTINUED | OUTPATIENT
Start: 2022-10-09 | End: 2022-10-15

## 2022-10-09 RX ADMIN — Medication 250 MILLIGRAM(S): at 23:35

## 2022-10-09 RX ADMIN — PIPERACILLIN AND TAZOBACTAM 200 GRAM(S): 4; .5 INJECTION, POWDER, LYOPHILIZED, FOR SOLUTION INTRAVENOUS at 23:00

## 2022-10-09 NOTE — ED ADULT NURSE NOTE - NSICDXPASTMEDICALHX_GEN_ALL_CORE_FT
PAST MEDICAL HISTORY:  Atrial fibrillation, unspecified type     HLD (hyperlipidemia)     Hypertension     Prostate cancer

## 2022-10-09 NOTE — ED ADULT NURSE NOTE - OBJECTIVE STATEMENT
Pt states has a bilateral leg wounds for the past 2 months. Pt states he was been having intermittent left leg pain. Pt denies SOB, CP, CHILLS, and a fever. Pt noted to have vascular wounds on both legs. Pt in no acute distress. Pt updated on plan of care.

## 2022-10-09 NOTE — ED PROVIDER NOTE - NS ED ATTENDING STATEMENT MOD
This was a shared visit with the DANYELL. I reviewed and verified the documentation and independently performed the documented:

## 2022-10-09 NOTE — H&P ADULT - NSHPREVIEWOFSYSTEMS_GEN_ALL_CORE
Review of Systems:   CONSTITUTIONAL: No fever, weight loss, or fatigue  EYES: No eye pain, visual disturbances, or discharge  ENMT:   No ear pain, No nose bleed; No sinus or throat pain  NECK: No pain or stiffness  RESPIRATORY: No cough, wheezing, or hemoptysis, no shortness of breath  CARDIOVASCULAR: No chest pain, palpitations, no leg swelling  GASTROINTESTINAL: No abdominal or epigastric pain. No nausea, vomiting, or hematemesis; No diarrhea or constipation. No melena or hematochezia.  GENITOURINARY: No dysuria, frequency, hematuria, or incontinence  NEUROLOGICAL: No headaches, memory loss, loss of strength, numbness; No dizziness  SKIN: + BURNING in left leg, + numbness + tingling in the shin area , + wounds ( ruptured bullae )   ENDOCRINE: No heat or cold intolerance; No hair loss  MUSCULOSKELETAL: No joint pain or swelling; No muscle, back, or + LE extremity pain ( L)

## 2022-10-09 NOTE — H&P ADULT - ASSESSMENT
Patient is 67-year-old male with PMHx of A. fib on Eliquis, hypertension, hyperlipidemia,  prostate cancer s/p prostatectomy, CABG x2 stents, hx of chronic venous stasis  here for left lower leg wounds.  Patient states has had wounds ( left leg ) flaccid blisters, for about 4 months  getting worse now. Admitted for cellulitis management.

## 2022-10-09 NOTE — ED ADULT NURSE NOTE - SUICIDE SCREENING DEPRESSION
"Here for R hip injection requested by Dr. Otto for severe OA. Reviewed MRI, discussed with patient.     Ultrasound-Guided Hip Injection Procedure Note    Right hip injection was discussed with the patient in detail, including indication, risks, benefits, and alternatives. Verbal consent was given for the procedure. Injection was performed by physician.  Injection site was identified by ultrasound examination, then cleaned with Betadine and alcohol swabs. Prior to needle insertion, ethyl chloride spray was used for surface anesthesia. Sterile technique was used. Ultrasound guidance was indicated due to proximity of neurovascular structures and injection accuracy. Joint effusion noted.   A 22-gauge, 3.5\" spinal needle was guided to the anterior joint capsule under continuous direct ultrasound visualization. Injectate was seen filing the capsule and passed without difficulty. The needle was removed and a simple bandage was applied. The procedure was tolerated well without difficulty.    Injection mixture:  1% lidocaine without epinephrine: 2 mL  40 mg/mL triamcinolone acetonide: 1 mL       Diagnoses and all orders for this visit:    Primary osteoarthritis of right hip  -     meloxicam (MOBIC) 7.5 MG tablet; Take 2 tablets by mouth Daily.  -     triamcinolone acetonide (KENALOG-40) injection 40 mg       " Negative

## 2022-10-09 NOTE — ED ADULT NURSE NOTE - NSIMPLEMENTINTERV_GEN_ALL_ED
Implemented All Fall with Harm Risk Interventions:  [place] to call system. Call bell, personal items and telephone within reach. Instruct patient to call for assistance. Room bathroom lighting operational. Non-slip footwear when patient is off stretcher. Physically safe environment: no spills, clutter or unnecessary equipment. Stretcher in lowest position, wheels locked, appropriate side rails in place. Provide visual cue, wrist band, yellow gown, etc. Monitor gait and stability. Monitor for mental status changes and reorient to person, place, and time. Review medications for side effects contributing to fall risk. Reinforce activity limits and safety measures with patient and family. Provide visual clues: red socks.

## 2022-10-09 NOTE — H&P ADULT - PROBLEM SELECTOR PLAN 4
- Chronic, pt with known history of a fib on Eliquis   - Continue home Eliquis and rate control lopressor 100mg daily with hold parameter    - Continue to monitor electrolytes.

## 2022-10-09 NOTE — H&P ADULT - HISTORY OF PRESENT ILLNESS
Patient is 67-year-old male with PMHx of A. fib on Eliquis, hypertension, hyperlipidemia,  prostate cancer s/p prostatectomy, CABG x2 stents, hx of chronic venous stasis  here for left lower leg wounds.  Patient states has had wounds ( left leg ) flaccid blisters, for about 4 months  getting worse now. Reports was seen by vascular Dr Huang Barakat, 4 months ago for lower extremity wounds and chronic venous stasis, compartment pressure was fine per patient. Reports he works as  , able to ambulate fine, but recently notice numbness/ tingling and burning in the left leg. Pain is getting worse with movement sometimes.  Patient denies any trauma to the area and denies any fever or chills. Today got worsening burning pain and almost fell over and got concerned for DVT so came to emergency room.    Denies any chest pain/ pressure, palpitation, any recent falls, hematuria/ or blood in stool.    IN ED  VITALS: /98, HR 88, RR 18, temp 98.6F  on RA   PERTINENT LABS:  WBC 10.52, ESR 21, K 3.4, LACTATE 3.2   LOWER EXT Doppler u/s: NEGATIVE for DVT LE

## 2022-10-09 NOTE — H&P ADULT - PROBLEM SELECTOR PLAN 1
- Hx of chronic venous stasis ,here with left lower leg wounds.  + numbness,  tingling and burning pain in the left leg.  Patient presents with erythema, +1 edema, and pain likely 2/2 LLE cellulitis  - hx of left leg flaccid blisters--> ruptured  for about 4 months, getting worse now.   - ESR 21, leucocytosis 10k, afebrile , doesn't meet SIRS criteria    - S/p 1x zosyn and vanco in ER   - LE doppler : negative for DVT   - X Ray LE: negative for fracture or dislocation   - Continue IV Vancomycin and IV Zosyn q8hrs  - Tylenol 650 mg PO q6h PRN for mild pain or fever  - f/u Bcx and Ucx, MRSA PCR   - Monitor WBC and fever curve  - ID Dr. Ny consulted, reccs tab  - Consulted wound care, follow rec   - patient follow out patient vascular Dr Huang conn , appointment next month - Hx of chronic venous stasis ,here with left lower leg wounds.  + numbness,  tingling and burning pain in the left leg.  Patient presents with erythema, +1 edema, and pain likely 2/2 LLE cellulitis  - hx of left leg flaccid blisters--> ruptured  for about 4 months, getting worse now.   - ESR 21, leucocytosis 10k, afebrile , doesn't meet SIRS criteria    - S/p 1x zosyn and vanco in ER   - LE doppler : negative for DVT   - X Ray LE: negative for fracture or dislocation   - Continue IV Vancomycin and IV Zosyn q8hrs  - Tylenol 650 mg PO q6h PRN for mild pain or fever  - f/u Bcx and Ucx, MRSA PCR   - Monitor WBC and fever curve  - ID Dr. Ny consulted, reccs tab  - Consulted wound care, follow rec   - pod consult  - patient follow out patient vascular Dr Huang conn , appointment next month

## 2022-10-09 NOTE — ED PROVIDER NOTE - OBJECTIVE STATEMENT
Patient is a 67-year-old male with past ministry of A. fib on Eliquis hypertension hyperlipidemia prostate cancer here for left lower leg wounds.  Patient states has had wounds for about few weeks getting worse.  Patient denies any trauma to the area and denies any fever or chills.  Today got worsening burning pain and almost fell over and got concerned for DVT so came to emergency room.  Denies any chest pain or shortness of breath.

## 2022-10-09 NOTE — H&P ADULT - NSHPPHYSICALEXAM_GEN_ALL_CORE
Vital Signs Last 24 Hrs  T(C): 37 (09 Oct 2022 18:25), Max: 37 (09 Oct 2022 18:25)  T(F): 98.6 (09 Oct 2022 18:25), Max: 98.6 (09 Oct 2022 18:25)  HR: 88 (09 Oct 2022 18:25) (88 - 88)  BP: 180/98 (09 Oct 2022 18:25) (180/98 - 180/98)  BP(mean): --  RR: 18 (09 Oct 2022 18:25) (18 - 18)  SpO2: 97% (09 Oct 2022 18:25) (97% - 97%)    Parameters below as of 09 Oct 2022 18:25  Patient On (Oxygen Delivery Method): room air    GENERAL:  no acute distress  EYES: sclera clear, EOM intact, PERRLA, no exudates  ENMT: normocephalic, atraumatic, moist mucous membranes, no lesions in oral cavity, TM intact  NECK: supple, soft, no thyromegaly noted, no LAD  LUNGS: good air entry bilaterally, clear to auscultation, symmetric breath sounds, no wheezing or rhonchi appreciated  HEART: soft S1/S2, regular rate and rhythm, no murmurs noted, no lower extremity edema  GASTROINTESTINAL: abdomen is soft, nontender, nondistended, normoactive bowel sounds, no palpable masses  INTEGUMENT:  left lower leg/shin with multiple open wounds with surrounding redness mild drainage, no streaking, flaccid bullae  ( ruptured ) + chronic venous stasis,  MUSCULOSKELETAL:  no calf tenderness on palpation on the R , left leg mild pain on palpation, + numbness+ tingling   NEUROLOGIC: awake, alert, oriented x3, good muscle tone in 4 extremities, no obvious sensory deficits Vital Signs Last 24 Hrs  T(C): 37 (09 Oct 2022 18:25), Max: 37 (09 Oct 2022 18:25)  T(F): 98.6 (09 Oct 2022 18:25), Max: 98.6 (09 Oct 2022 18:25)  HR: 88 (09 Oct 2022 18:25) (88 - 88)  BP: 180/98 (09 Oct 2022 18:25) (180/98 - 180/98)  BP(mean): --  RR: 18 (09 Oct 2022 18:25) (18 - 18)  SpO2: 97% (09 Oct 2022 18:25) (97% - 97%)    Parameters below as of 09 Oct 2022 18:25  Patient On (Oxygen Delivery Method): room air    GENERAL:  no acute distress  EYES: sclera clear, EOM intact, PERRLA, no exudates  ENMT: normocephalic, atraumatic, moist mucous membranes, no lesions in oral cavity, TM intact  NECK: supple, soft, no thyromegaly noted, no LAD  LUNGS: good air entry bilaterally, clear to auscultation, symmetric breath sounds, no wheezing or rhonchi appreciated  HEART: soft S1/S2, regular rate and rhythm, no murmurs noted, no lower extremity edema  GASTROINTESTINAL: abdomen is soft, nontender, nondistended, normoactive bowel sounds  INTEGUMENT:  left lower leg/shin with multiple open wounds with surrounding redness mild drainage, no streaking, flaccid bullae  ( ruptured ) + chronic venous stasis,  MUSCULOSKELETAL:  no calf tenderness on palpation on the R , left leg mild pain on palpation, + numbness+ tingling   NEUROLOGIC: awake, alert, oriented x3, good muscle tone in 4 extremities, no obvious sensory deficits

## 2022-10-09 NOTE — H&P ADULT - NSHPSOCIALHISTORY_GEN_ALL_CORE
Tobacco:  DENIES   EtOH:  DENIES  Recreational drug use: DENIES   Lives with: Family   Ambulates: independently   Vaccinations: Fully COVID- 19 vaccinated, work as

## 2022-10-09 NOTE — H&P ADULT - ATTENDING COMMENTS
Patient is 67-year-old male with PMHx of A. fib on Eliquis, hypertension, hyperlipidemia,  prostate cancer s/p prostatectomy, CABG x2 stents, hx of chronic venous stasis  here for left lower leg wounds.  Patient states has had wounds ( left leg ) flaccid blisters, for about 4 months  getting worse now. Admitted for cellulitis management.     IV abx. f/u culture data. ID consult, pod consult, wound care consult. dopplers negative for DVT.

## 2022-10-09 NOTE — H&P ADULT - PROBLEM SELECTOR PLAN 3
- chronic  - at home BP meds, losartan/HCTZ 100mg-25mg daily , clonidine 0.3mg daily , continue with hold parameters   - will monitor cmp am  - DASH/TLC  - monitor BP & titrate BP meds PRN.

## 2022-10-09 NOTE — ED PROVIDER NOTE - MUSCULOSKELETAL, MLM
left lower leg/shin with multiple open wounds with surrounding redness mild drainage nvi no streaking

## 2022-10-09 NOTE — ED PROVIDER NOTE - CLINICAL SUMMARY MEDICAL DECISION MAKING FREE TEXT BOX
Patient is a 67-year-old male presenting with 3 weeks of left lower leg pain and lesions now ulcers.  Pain has become severe.  On exam the patient with multiple skin ulcers on his anterior leg on the left side with no pus and surrounding chronic venous stasis changes no crepitus of the leg.  Patient's x-rays and ultrasound are normal exam as well with normal pulses in the feet however patient's lactate is elevated will give IV antibiotics and admit for further care and evaluation by wound care

## 2022-10-10 ENCOUNTER — TRANSCRIPTION ENCOUNTER (OUTPATIENT)
Age: 67
End: 2022-10-10

## 2022-10-10 DIAGNOSIS — R09.89 OTHER SPECIFIED SYMPTOMS AND SIGNS INVOLVING THE CIRCULATORY AND RESPIRATORY SYSTEMS: ICD-10-CM

## 2022-10-10 LAB
A1C WITH ESTIMATED AVERAGE GLUCOSE RESULT: 6.1 % — HIGH (ref 4–5.6)
ALBUMIN SERPL ELPH-MCNC: 3.7 G/DL — SIGNIFICANT CHANGE UP (ref 3.3–5)
ALP SERPL-CCNC: 77 U/L — SIGNIFICANT CHANGE UP (ref 40–120)
ALT FLD-CCNC: 31 U/L — SIGNIFICANT CHANGE UP (ref 12–78)
ANION GAP SERPL CALC-SCNC: 8 MMOL/L — SIGNIFICANT CHANGE UP (ref 5–17)
AST SERPL-CCNC: 19 U/L — SIGNIFICANT CHANGE UP (ref 15–37)
BASOPHILS # BLD AUTO: 0.04 K/UL — SIGNIFICANT CHANGE UP (ref 0–0.2)
BASOPHILS NFR BLD AUTO: 0.4 % — SIGNIFICANT CHANGE UP (ref 0–2)
BILIRUB SERPL-MCNC: 1.6 MG/DL — HIGH (ref 0.2–1.2)
BUN SERPL-MCNC: 22 MG/DL — SIGNIFICANT CHANGE UP (ref 7–23)
CALCIUM SERPL-MCNC: 9.6 MG/DL — SIGNIFICANT CHANGE UP (ref 8.5–10.1)
CHLORIDE SERPL-SCNC: 104 MMOL/L — SIGNIFICANT CHANGE UP (ref 96–108)
CHOLEST SERPL-MCNC: 109 MG/DL — SIGNIFICANT CHANGE UP
CO2 SERPL-SCNC: 28 MMOL/L — SIGNIFICANT CHANGE UP (ref 22–31)
CREAT SERPL-MCNC: 1.3 MG/DL — SIGNIFICANT CHANGE UP (ref 0.5–1.3)
EGFR: 60 ML/MIN/1.73M2 — SIGNIFICANT CHANGE UP
EOSINOPHIL # BLD AUTO: 0.3 K/UL — SIGNIFICANT CHANGE UP (ref 0–0.5)
EOSINOPHIL NFR BLD AUTO: 3.3 % — SIGNIFICANT CHANGE UP (ref 0–6)
ESTIMATED AVERAGE GLUCOSE: 128 MG/DL — HIGH (ref 68–114)
GLUCOSE SERPL-MCNC: 159 MG/DL — HIGH (ref 70–99)
HCT VFR BLD CALC: 40.4 % — SIGNIFICANT CHANGE UP (ref 39–50)
HCV AB S/CO SERPL IA: 0.07 S/CO — SIGNIFICANT CHANGE UP (ref 0–0.99)
HCV AB SERPL-IMP: SIGNIFICANT CHANGE UP
HDLC SERPL-MCNC: 34 MG/DL — LOW
HGB BLD-MCNC: 14.3 G/DL — SIGNIFICANT CHANGE UP (ref 13–17)
IMM GRANULOCYTES NFR BLD AUTO: 0.2 % — SIGNIFICANT CHANGE UP (ref 0–0.9)
LACTATE SERPL-SCNC: 1.5 MMOL/L — SIGNIFICANT CHANGE UP (ref 0.7–2)
LIPID PNL WITH DIRECT LDL SERPL: 57 MG/DL — SIGNIFICANT CHANGE UP
LYMPHOCYTES # BLD AUTO: 1.64 K/UL — SIGNIFICANT CHANGE UP (ref 1–3.3)
LYMPHOCYTES # BLD AUTO: 17.9 % — SIGNIFICANT CHANGE UP (ref 13–44)
MCHC RBC-ENTMCNC: 31.4 PG — SIGNIFICANT CHANGE UP (ref 27–34)
MCHC RBC-ENTMCNC: 35.4 GM/DL — SIGNIFICANT CHANGE UP (ref 32–36)
MCV RBC AUTO: 88.6 FL — SIGNIFICANT CHANGE UP (ref 80–100)
MONOCYTES # BLD AUTO: 0.82 K/UL — SIGNIFICANT CHANGE UP (ref 0–0.9)
MONOCYTES NFR BLD AUTO: 8.9 % — SIGNIFICANT CHANGE UP (ref 2–14)
NEUTROPHILS # BLD AUTO: 6.35 K/UL — SIGNIFICANT CHANGE UP (ref 1.8–7.4)
NEUTROPHILS NFR BLD AUTO: 69.3 % — SIGNIFICANT CHANGE UP (ref 43–77)
NON HDL CHOLESTEROL: 75 MG/DL — SIGNIFICANT CHANGE UP
NRBC # BLD: 0 /100 WBCS — SIGNIFICANT CHANGE UP (ref 0–0)
PLATELET # BLD AUTO: 236 K/UL — SIGNIFICANT CHANGE UP (ref 150–400)
POTASSIUM SERPL-MCNC: 3.5 MMOL/L — SIGNIFICANT CHANGE UP (ref 3.5–5.3)
POTASSIUM SERPL-SCNC: 3.5 MMOL/L — SIGNIFICANT CHANGE UP (ref 3.5–5.3)
PROT SERPL-MCNC: 7.9 G/DL — SIGNIFICANT CHANGE UP (ref 6–8.3)
RBC # BLD: 4.56 M/UL — SIGNIFICANT CHANGE UP (ref 4.2–5.8)
RBC # FLD: 13.5 % — SIGNIFICANT CHANGE UP (ref 10.3–14.5)
SODIUM SERPL-SCNC: 140 MMOL/L — SIGNIFICANT CHANGE UP (ref 135–145)
TRIGL SERPL-MCNC: 93 MG/DL — SIGNIFICANT CHANGE UP
WBC # BLD: 9.17 K/UL — SIGNIFICANT CHANGE UP (ref 3.8–10.5)
WBC # FLD AUTO: 9.17 K/UL — SIGNIFICANT CHANGE UP (ref 3.8–10.5)

## 2022-10-10 PROCEDURE — 93010 ELECTROCARDIOGRAM REPORT: CPT

## 2022-10-10 PROCEDURE — 99232 SBSQ HOSP IP/OBS MODERATE 35: CPT | Mod: GC

## 2022-10-10 PROCEDURE — 99222 1ST HOSP IP/OBS MODERATE 55: CPT

## 2022-10-10 RX ORDER — GABAPENTIN 400 MG/1
100 CAPSULE ORAL EVERY 8 HOURS
Refills: 0 | Status: DISCONTINUED | OUTPATIENT
Start: 2022-10-10 | End: 2022-10-13

## 2022-10-10 RX ORDER — METOPROLOL TARTRATE 50 MG
100 TABLET ORAL DAILY
Refills: 0 | Status: DISCONTINUED | OUTPATIENT
Start: 2022-10-10 | End: 2022-10-15

## 2022-10-10 RX ADMIN — ATORVASTATIN CALCIUM 40 MILLIGRAM(S): 80 TABLET, FILM COATED ORAL at 22:46

## 2022-10-10 RX ADMIN — Medication 81 MILLIGRAM(S): at 12:37

## 2022-10-10 RX ADMIN — Medication 166.67 MILLIGRAM(S): at 12:38

## 2022-10-10 RX ADMIN — GABAPENTIN 100 MILLIGRAM(S): 400 CAPSULE ORAL at 12:38

## 2022-10-10 RX ADMIN — Medication 0.3 MILLIGRAM(S): at 22:47

## 2022-10-10 RX ADMIN — GABAPENTIN 100 MILLIGRAM(S): 400 CAPSULE ORAL at 05:58

## 2022-10-10 RX ADMIN — LOSARTAN POTASSIUM 100 MILLIGRAM(S): 100 TABLET, FILM COATED ORAL at 05:59

## 2022-10-10 RX ADMIN — Medication 650 MILLIGRAM(S): at 00:50

## 2022-10-10 RX ADMIN — PIPERACILLIN AND TAZOBACTAM 25 GRAM(S): 4; .5 INJECTION, POWDER, LYOPHILIZED, FOR SOLUTION INTRAVENOUS at 12:38

## 2022-10-10 RX ADMIN — Medication 25 MILLIGRAM(S): at 05:58

## 2022-10-10 RX ADMIN — Medication 650 MILLIGRAM(S): at 23:46

## 2022-10-10 RX ADMIN — Medication 650 MILLIGRAM(S): at 05:59

## 2022-10-10 RX ADMIN — APIXABAN 5 MILLIGRAM(S): 2.5 TABLET, FILM COATED ORAL at 17:27

## 2022-10-10 RX ADMIN — APIXABAN 5 MILLIGRAM(S): 2.5 TABLET, FILM COATED ORAL at 05:58

## 2022-10-10 RX ADMIN — Medication 1 TABLET(S): at 12:38

## 2022-10-10 RX ADMIN — GABAPENTIN 100 MILLIGRAM(S): 400 CAPSULE ORAL at 22:47

## 2022-10-10 RX ADMIN — PIPERACILLIN AND TAZOBACTAM 25 GRAM(S): 4; .5 INJECTION, POWDER, LYOPHILIZED, FOR SOLUTION INTRAVENOUS at 22:47

## 2022-10-10 RX ADMIN — PIPERACILLIN AND TAZOBACTAM 25 GRAM(S): 4; .5 INJECTION, POWDER, LYOPHILIZED, FOR SOLUTION INTRAVENOUS at 05:59

## 2022-10-10 RX ADMIN — Medication 650 MILLIGRAM(S): at 22:46

## 2022-10-10 RX ADMIN — Medication 0.3 MILLIGRAM(S): at 00:49

## 2022-10-10 NOTE — PHYSICAL THERAPY INITIAL EVALUATION ADULT - CRITERIA FOR SKILLED THERAPEUTIC INTERVENTIONS
impairments found
Adalberto is a 6 y/o girl here with acute swelling of the left hand. On physical exam the lesion is soft and erythematous with significant swelling that only slightly decreased with benadryl. Ddx includes allergic reaction to insect bite and cellulitis. Recommend continued treatment with benadryl and motrin for pain and swelling, will treat with IM ceftriaxone to cover cellulitic pathogens. Family will come back for reassessment tomorrow to ensure continued improvement.

## 2022-10-10 NOTE — PROGRESS NOTE ADULT - PROBLEM SELECTOR PLAN 1
- hx of left leg flaccid blisters, worse now with parasthesias and swelling   - Continue Zosyn, d/c vancomycin per ID recs   - LE doppler : negative for DVT   - X Ray LE: negative for fracture or dislocation   - Tylenol 650 mg PO q6h PRN for mild pain or fever  - f/u Bcx and Ucx, MRSA nasal swab   - Monitor WBC and fever curve  - ID following   - Consulted wound care, follow rec   - pod consult

## 2022-10-10 NOTE — DISCHARGE NOTE PROVIDER - NSDCMRMEDTOKEN_GEN_ALL_CORE_FT
Aspirin Enteric Coated 81 mg oral delayed release tablet: 1 tab(s) orally once a day  atorvastatin 40 mg oral tablet: 1 tab(s) orally once a day  cloNIDine 0.3 mg oral tablet: 1 tab(s) orally once a day (at bedtime)  Eliquis 5 mg oral tablet: 1 tab(s) orally 2 times a day  losartan-hydroCHLOROthiazide 100 mg-25 mg oral tablet: 1 tab(s) orally once a day  Metoprolol Succinate  mg oral tablet, extended release: 1 tab(s) orally once a day  Multiple Vitamins oral tablet: 1 tab(s) orally once a day   amoxicillin-clavulanate 875 mg-125 mg oral tablet: 1 tab(s) orally 2 times a day (after meals)   Aspirin Enteric Coated 81 mg oral delayed release tablet: 1 tab(s) orally once a day  atorvastatin 40 mg oral tablet: 1 tab(s) orally once a day  cloNIDine 0.3 mg oral tablet: 1 tab(s) orally once a day (at bedtime)  Eliquis 5 mg oral tablet: 1 tab(s) orally 2 times a day  gabapentin 300 mg oral capsule: 1 cap(s) orally 2 times a day  losartan-hydroCHLOROthiazide 100 mg-25 mg oral tablet: 1 tab(s) orally once a day  Metoprolol Succinate  mg oral tablet, extended release: 1 tab(s) orally once a day  Multiple Vitamins oral tablet: 1 tab(s) orally once a day   amoxicillin-clavulanate 875 mg-125 mg oral tablet: 1 tab(s) orally 2 times a day (after meals)   Aspirin Enteric Coated 81 mg oral delayed release tablet: 1 tab(s) orally once a day  atorvastatin 40 mg oral tablet: 1 tab(s) orally once a day  cloNIDine 0.3 mg oral tablet: 1 tab(s) orally once a day (at bedtime)  Eliquis 5 mg oral tablet: 1 tab(s) orally 2 times a day  gabapentin 300 mg oral capsule: 1 cap(s) orally once a day (at bedtime)  losartan-hydroCHLOROthiazide 100 mg-25 mg oral tablet: 1 tab(s) orally once a day  Metoprolol Succinate  mg oral tablet, extended release: 1 tab(s) orally once a day  Multiple Vitamins oral tablet: 1 tab(s) orally once a day   amoxicillin-clavulanate 875 mg-125 mg oral tablet: 1 tab(s) orally 2 times a day  amoxicillin-clavulanate 875 mg-125 mg oral tablet: 1 tab(s) orally 2 times a day (after meals)   Aspirin Enteric Coated 81 mg oral delayed release tablet: 1 tab(s) orally once a day  atorvastatin 40 mg oral tablet: 1 tab(s) orally once a day  cloNIDine 0.3 mg oral tablet: 1 tab(s) orally once a day (at bedtime)  Eliquis 5 mg oral tablet: 1 tab(s) orally 2 times a day  gabapentin 300 mg oral capsule: 1 cap(s) orally once a day (at bedtime)  losartan-hydroCHLOROthiazide 100 mg-25 mg oral tablet: 1 tab(s) orally once a day  Metoprolol Succinate  mg oral tablet, extended release: 1 tab(s) orally once a day  Multiple Vitamins oral tablet: 1 tab(s) orally once a day  Physical Therapy : PT  2  times a week       R26.2   amoxicillin-clavulanate 875 mg-125 mg oral tablet: 1 tab(s) orally 2 times a day (after meals)   Aspirin Enteric Coated 81 mg oral delayed release tablet: 1 tab(s) orally once a day  atorvastatin 40 mg oral tablet: 1 tab(s) orally once a day  cloNIDine 0.3 mg oral tablet: 1 tab(s) orally once a day (at bedtime)  Eliquis 5 mg oral tablet: 1 tab(s) orally 2 times a day  gabapentin 300 mg oral capsule: 1 cap(s) orally once a day (at bedtime)  losartan-hydroCHLOROthiazide 100 mg-25 mg oral tablet: 1 tab(s) orally once a day  Metoprolol Succinate  mg oral tablet, extended release: 1 tab(s) orally once a day  Multiple Vitamins oral tablet: 1 tab(s) orally once a day  Physical Therapy : PT  2  times a week       R26.2  saccharomyces boulardii lyo 250 mg oral capsule: 1 cap(s) orally 2 times a day

## 2022-10-10 NOTE — DISCHARGE NOTE PROVIDER - NSDCCPCAREPLAN_GEN_ALL_CORE_FT
PRINCIPAL DISCHARGE DIAGNOSIS  Diagnosis: Left leg cellulitis  Assessment and Plan of Treatment: You were diagnosed with cellulitis and were treated with the antibiotic Zosyn.      SECONDARY DISCHARGE DIAGNOSES  Diagnosis: Wound of left leg  Assessment and Plan of Treatment:     Diagnosis: HTN (hypertension)  Assessment and Plan of Treatment: You were previously diagnosed with high blood pressure. Please continue on your home medications at this time and follow up with your PMD for further surveillance and management of your high blood pressure.    Diagnosis: Chronic atrial fibrillation  Assessment and Plan of Treatment: You were previously diagnosed with Afib. Please continue to take your home medications and follow up with your cardiologist for further management.     PRINCIPAL DISCHARGE DIAGNOSIS  Diagnosis: Left leg cellulitis  Assessment and Plan of Treatment: You were diagnosed with cellulitis and were treated with the antibiotic Zosyn.      SECONDARY DISCHARGE DIAGNOSES  Diagnosis: Wound of left leg  Assessment and Plan of Treatment:     Diagnosis: HTN (hypertension)  Assessment and Plan of Treatment: You were previously diagnosed with high blood pressure. Please continue on your home medications at this time and follow up with your PMD for further surveillance and management of your high blood pressure.    Diagnosis: Chronic atrial fibrillation  Assessment and Plan of Treatment: You were previously diagnosed with Afib. Please continue to take your home medications and follow up with your cardiologist for further management.    Diagnosis: Unsteadiness  Assessment and Plan of Treatment:      PRINCIPAL DISCHARGE DIAGNOSIS  Diagnosis: Left leg cellulitis  Assessment and Plan of Treatment: You were diagnosed with cellulitis which is a skin infection, and were treated with the antibiotic Zosyn. Your condition improved and you are being discharged on Augmentin 875/125mg.   PLEASE TAKE AUGMENTIN 2 TIMES A DAY WITH MEALS FOR ONE WEEK.      SECONDARY DISCHARGE DIAGNOSES  Diagnosis: Unsteadiness  Assessment and Plan of Treatment: You were found to be unsteady and to have balance issues. An MRI of the head showed no acute findings. An MRI of your lumbar spine showed chronic degenerative changes to your spine but no acute or concerning changes that can explain your unsteadiness.   PLEASE FOLLOW UP WITH YOUR NEUROLOGIST IN 1 WEEK AS AN OUTPATIENT FOR FURTHER MANAGEMENT    Diagnosis: Wound of left leg  Assessment and Plan of Treatment: PLEASE FOLLOW UP WITH WOUND CARE CLINIC IN ONE WEEK FOR DRESSING CHANGES.    Diagnosis: HTN (hypertension)  Assessment and Plan of Treatment: You were previously diagnosed with high blood pressure. Please continue on your home medications at this time and follow up with your PMD for further surveillance and management of your high blood pressure.  CONTINUE TAKING LOSARTIN-HCTZ AND METOPROLOL    Diagnosis: Chronic atrial fibrillation  Assessment and Plan of Treatment: You were previously diagnosed with Afib. Please continue to take your home medications and follow up with your cardiologist for further management.  CONTINUE TAKING ELIQUIS AS DIRECTED     PRINCIPAL DISCHARGE DIAGNOSIS  Diagnosis: Left leg cellulitis  Assessment and Plan of Treatment: You were diagnosed with cellulitis which is a skin infection, and were treated with the antibiotic Zosyn. Your condition improved and you are being discharged on Augmentin 875/125mg.   PLEASE TAKE AUGMENTIN 2 TIMES A DAY WITH MEALS FOR ONE WEEK. Take all pills until none are remaining to complete the full course.      SECONDARY DISCHARGE DIAGNOSES  Diagnosis: Wound of left leg  Assessment and Plan of Treatment: PLEASE FOLLOW UP WITH WOUND CARE CLINIC IN ONE WEEK FOR DRESSING CHANGES. Follow up with your vascular surgeon as scheduled.    Diagnosis: Unsteadiness  Assessment and Plan of Treatment: You were found to be unsteady and to have balance issues. An MRI of the head showed no acute findings. An MRI of your lumbar spine showed chronic degenerative changes to your spine but no acute or concerning changes that can explain your unsteadiness.   PLEASE FOLLOW UP WITH YOUR NEUROLOGIST IN 1 WEEK AS AN OUTPATIENT FOR FURTHER MANAGEMENT AND TESTING.    Diagnosis: HTN (hypertension)  Assessment and Plan of Treatment: You were previously diagnosed with high blood pressure. Please continue on your home medications at this time and follow up with your PMD for further surveillance and management of your high blood pressure.  CONTINUE TAKING LOSARTIN-HCTZ, METOPROLOL AND CLONIDINE    Diagnosis: Chronic atrial fibrillation  Assessment and Plan of Treatment: You were previously diagnosed with Afib. Please continue to take your home medications and follow up with your cardiologist for further management.  CONTINUE TAKING ELIQUIS AS DIRECTED     PRINCIPAL DISCHARGE DIAGNOSIS  Diagnosis: Left leg cellulitis  Assessment and Plan of Treatment: You were diagnosed with cellulitis which is a skin infection, and were treated with the antibiotic Zosyn. Your condition improved and you are being discharged on Augmentin 875/125mg for one more week per the recommendations of the infectious disease physician who was following your case.   PLEASE TAKE AUGMENTIN 2 TIMES A DAY WITH MEALS FOR ONE WEEK. Take all pills until none are remaining to complete the full course.   Take a probiotic such as the prescribed saccharomyces for a week while on antibiotics, to decrease chance of gastrointestinal side effects. If your pharamacy does not carry saccharomyces, you may use another protiotic they have over the counter.   Follow up with your PMD and/or the infectious disease physician, Dr. Ny (number below) in a week.  If you have fever, uncontrolled shivers, worsening leg pain/swelling/redness, or other symptoms that concern you call your doctor immediately or return to the ER.  Follow up in the wound care center with Dr. Bolden (number below) this week to reassess your leg.   Wound care instructions per podiatry team:  1. Cleanse wound with sterile saline solution and gently pat dry.  2. Dress wound with protective dry, sterile dressing, do not debride scabs - allow to fall off on their own with regular washing.   3. Change dressings daily and monitor for any signs of infection.  4. Patient is to follow up in [facility] on [address] [place] [address] with Dr. Cifuentes or Dr. Bolden / Dr. Manuel within 1 week upon discharge. Tel: [phone].      SECONDARY DISCHARGE DIAGNOSES  Diagnosis: Wound of left leg  Assessment and Plan of Treatment: Follow up in the wound care center with Dr. Bolden (number below) this week to reassess your leg.   Wound care instructions per podiatry team:  1. Cleanse wound with sterile saline solution and gently pat dry.  2. Dress wound with protective dry, sterile dressing, do not debride scabs - allow to fall off on their own with regular washing.   3. Change dressings daily and monitor for any signs of infection.  4. Patient is to follow up in the [facility] on [address] [place] [address] with Dr. Cifuentes or Dr. Bolden / Dr. Manuel within 1 week upon discharge. Tel: [phone].  Follow up with your vascular surgeon as scheduled.    Diagnosis: HTN (hypertension)  Assessment and Plan of Treatment: You were previously diagnosed with high blood pressure. Please continue on your home medications at this time and follow up with your PMD and/or cardiologist for further surveillance and management of your high blood pressure.  CONTINUE TAKING LOSARTIN-HCTZ, METOPROLOL AND CLONIDINE. Monitor your blood pressure and make a log of values 1-2x per day. Show the readings to your PMD or cardiologist to help them decide if any changes need to be made to your regimen.    Diagnosis: Chronic atrial fibrillation  Assessment and Plan of Treatment: You were previously diagnosed with Afib. Please continue to take your home medications and follow up with your cardiologist for further management.  CONTINUE TAKING ELIQUIS AND METOPROLOL AS DIRECTED and follow up with your cardiologist.    Diagnosis: Unsteadiness  Assessment and Plan of Treatment: You were found to be unsteady and to have balance issues. An MRI of the head showed no acute findings. An MRI of your lumbar spine showed chronic degenerative changes to your spine but no acute or concerning changes that can explain your unsteadiness.   PLEASE FOLLOW UP WITH YOUR NEUROLOGIST IN 1 WEEK AS AN OUTPATIENT FOR FURTHER MANAGEMENT AND TESTING.

## 2022-10-10 NOTE — PHYSICAL THERAPY INITIAL EVALUATION ADULT - PERTINENT HX OF CURRENT PROBLEM, REHAB EVAL
Patient is 67-year-old male with PMHx of A. fib on Eliquis, hypertension, hyperlipidemia,  prostate cancer s/p prostatectomy, CABG x2 stents, hx of chronic venous stasis  here for left lower leg wounds.  Patient states has had wounds ( left leg ) flaccid blisters, for about 4 months  getting worse now. Reports was seen by vascular Dr Huang Barakat, 4 months ago for lower extremity wounds and chronic venous stasis, compartment pressure was fine per patient. Reports he works as  , able to ambulate fine, but recently notice numbness/ tingling and burning in the left leg. Pain is getting worse with movement sometimes.  Patient denies any trauma to the area and denies any fever or chills. Today got worsening burning pain and almost fell over and got concerned for DVT so came to emergency room.    Denies any chest pain/ pressure, palpitation, any recent falls, hematuria/ or blood in stool.

## 2022-10-10 NOTE — PROGRESS NOTE ADULT - SUBJECTIVE AND OBJECTIVE BOX
Patient is a 67y old  Male who presents with a chief complaint of LLE cellulitis (10 Oct 2022 09:59)      Subjective:  INTERVAL HPI/OVERNIGHT EVENTS: Patient seen and examined at bedside. No overnight events occurred. Patient has no complaints at this time. Denies fevers, chills, headache, lightheadedness, chest pain, dyspnea, abdominal pain, n/v/d/c.    MEDICATIONS  (STANDING):  apixaban 5 milliGRAM(s) Oral every 12 hours  aspirin enteric coated 81 milliGRAM(s) Oral daily  atorvastatin 40 milliGRAM(s) Oral at bedtime  cloNIDine 0.3 milliGRAM(s) Oral at bedtime  gabapentin 100 milliGRAM(s) Oral every 8 hours  hydrochlorothiazide 25 milliGRAM(s) Oral daily  losartan 100 milliGRAM(s) Oral daily  multivitamin 1 Tablet(s) Oral daily  piperacillin/tazobactam IVPB.. 3.375 Gram(s) IV Intermittent every 8 hours    MEDICATIONS  (PRN):  acetaminophen     Tablet .. 650 milliGRAM(s) Oral every 6 hours PRN Temp greater or equal to 38C (100.4F), Mild Pain (1 - 3)  aluminum hydroxide/magnesium hydroxide/simethicone Suspension 30 milliLiter(s) Oral every 4 hours PRN Dyspepsia  melatonin 3 milliGRAM(s) Oral at bedtime PRN Insomnia  ondansetron Injectable 4 milliGRAM(s) IV Push every 8 hours PRN Nausea and/or Vomiting      Allergies    No Known Allergies    Intolerances        REVIEW OF SYSTEMS:  CONSTITUTIONAL: No fever or chills  HEENT:  No headache, no sore throat  RESPIRATORY: No cough, wheezing, or shortness of breath  CARDIOVASCULAR: No chest pain, palpitations  GASTROINTESTINAL: No abd pain, nausea, vomiting, or diarrhea  GENITOURINARY: No dysuria, frequency, or hematuria  NEUROLOGICAL: no focal weakness or dizziness  MUSCULOSKELETAL: leg swelling and ulceration     Objective:  Vital Signs Last 24 Hrs  T(C): 36.3 (10 Oct 2022 05:41), Max: 37 (09 Oct 2022 18:25)  T(F): 97.3 (10 Oct 2022 05:41), Max: 98.6 (09 Oct 2022 18:25)  HR: 73 (10 Oct 2022 05:41) (73 - 88)  BP: 155/99 (10 Oct 2022 05:41) (155/99 - 180/98)  BP(mean): --  RR: 18 (10 Oct 2022 05:41) (18 - 18)  SpO2: 97% (10 Oct 2022 05:41) (97% - 97%)    Parameters below as of 09 Oct 2022 18:25  Patient On (Oxygen Delivery Method): room air        GENERAL: NAD, lying in bed comfortably  HEAD:  Atraumatic, Normocephalic  EYES: EOMI, PERRLA, conjunctiva and sclera clear  ENT: Moist mucous membranes  NECK: Supple, No JVD  CHEST/LUNG: Clear to auscultation bilaterally; No rales, rhonchi, wheezing, or rubs. Unlabored respirations  HEART: Regular rate and rhythm; No murmurs, rubs, or gallops  ABDOMEN: Bowel sounds present; Soft, Nontender, Nondistended. No hepatomegaly  EXTREMITIES:  2+ Peripheral Pulses, brisk capillary refill. No clubbing, cyanosis, or edema  NERVOUS SYSTEM:  Alert & Oriented X3, speech clear. No deficits   MSK: FROM all 4 extremities, full and equal strength  SKIN: LLE with multiple dry ulcers, leg is warm and red. no purulent drainage expressing.     LABS:                        14.3   9.17  )-----------( 236      ( 10 Oct 2022 09:37 )             40.4     CBC Full  -  ( 10 Oct 2022 09:37 )  WBC Count : 9.17 K/uL  Hemoglobin : 14.3 g/dL  Hematocrit : 40.4 %  Platelet Count - Automated : 236 K/uL  Mean Cell Volume : 88.6 fl  Mean Cell Hemoglobin : 31.4 pg  Mean Cell Hemoglobin Concentration : 35.4 gm/dL  Auto Neutrophil # : 6.35 K/uL  Auto Lymphocyte # : 1.64 K/uL  Auto Monocyte # : 0.82 K/uL  Auto Eosinophil # : 0.30 K/uL  Auto Basophil # : 0.04 K/uL  Auto Neutrophil % : 69.3 %  Auto Lymphocyte % : 17.9 %  Auto Monocyte % : 8.9 %  Auto Eosinophil % : 3.3 %  Auto Basophil % : 0.4 %    10 Oct 2022 09:37    140    |  104    |  22     ----------------------------<  159    3.5     |  28     |  1.30     Ca    9.6        10 Oct 2022 09:37    TPro  7.9    /  Alb  3.7    /  TBili  1.6    /  DBili  x      /  AST  19     /  ALT  31     /  AlkPhos  77     10 Oct 2022 09:37        CAPILLARY BLOOD GLUCOSE              RADIOLOGY & ADDITIONAL TESTS:    Personally reviewed.     Consultant(s) Notes Reviewed:  [x] YES  [ ] NO

## 2022-10-10 NOTE — DISCHARGE NOTE PROVIDER - CARE PROVIDER_API CALL
Ritesh Briseno  NEUROLOGY  [address] [place] [address]  Phone: [phone]  Fax: [phone]  Follow Up Time: 1 week    Adam Bolden (DPM)  Foot Surgery; Podiatric Medicine; [facility]  [address] [place] [address]  Phone: [phone]  Fax: [phone]  Follow Up Time: 1 week    Timothy Lozano  FAMILY MEDICINE  Internal Medicine, [address], [address] [place] [address]  Phone: [phone]  Fax: [phone]  Follow Up Time: 2 weeks   Ritesh Briseno  NEUROLOGY  [address] [place] [address]  Phone: [phone]  Fax: [phone]  Follow Up Time: 1 week    Adam Bolden (DPM)  Foot Surgery; Podiatric Medicine; [facility]  [address] [place] [address]  Phone: [phone]  Fax: [phone]  Follow Up Time: 1 week    Timothy Lozano  FAMILY MEDICINE  Internal Medicine, [address] [facility], [address] [place] [address]  Phone: [phone]  Fax: [phone]  Follow Up Time: 2 weeks    Lupe Ny)  Infectious Disease; Internal Medicine  [address] [place] [address]  Phone: [phone]  Fax: [phone]  Follow Up Time:

## 2022-10-10 NOTE — PHYSICAL THERAPY INITIAL EVALUATION ADULT - ADDITIONAL COMMENTS
Patient states he lives in a current home with steps, but he is in the process of moving to another apartment, in a private home with no steps.

## 2022-10-10 NOTE — PROGRESS NOTE ADULT - PROBLEM SELECTOR PLAN 4
- Chronic, pt with known history of a fib on Eliquis   - Continue home Eliquis and rate control lopressor 100mg daily with hold parameter    - Continue to monitor electrolytes. - Chronic, pt with known history of a fib on Eliquis   - Continue home Eliquis and rate control lopressor ER 100mg daily with hold parameter    - Continue to monitor electrolytes.

## 2022-10-10 NOTE — DISCHARGE NOTE PROVIDER - PROVIDER TOKENS
PROVIDER:[TOKEN:[[id number]],FOLLOWUP:[1 week]],PROVIDER:[TOKEN:[[id number]],FOLLOWUP:[1 week]],PROVIDER:[TOKEN:[[id number]],FOLLOWUP:[2 weeks]] PROVIDER:[TOKEN:[[id number]],FOLLOWUP:[1 week]],PROVIDER:[TOKEN:[[id number]],FOLLOWUP:[1 week]],PROVIDER:[TOKEN:[[id number]],FOLLOWUP:[2 weeks]],PROVIDER:[TOKEN:[[id number]]]

## 2022-10-10 NOTE — DISCHARGE NOTE PROVIDER - CARE PROVIDERS DIRECT ADDRESSES
,DirectAddress_Unknown,DirectAddress_Unknown,DirectAddress_Unknown ,DirectAddress_Unknown,DirectAddress_Unknown,DirectAddress_Unknown,[email or web address]

## 2022-10-10 NOTE — DISCHARGE NOTE PROVIDER - HOSPITAL COURSE
FROM ADMISSION H+P:   HPI:  Patient is 67-year-old male with PMHx of ARUN fib on Eliquis, hypertension, hyperlipidemia,  prostate cancer s/p prostatectomy, CABG x2 stents, hx of chronic venous stasis  here for left lower leg wounds.  Patient states has had wounds ( left leg ) flaccid blisters, for about 4 months  getting worse now. Reports was seen by vascular Dr Huang Barakat, 4 months ago for lower extremity wounds and chronic venous stasis, compartment pressure was fine per patient. Reports he works as  , able to ambulate fine, but recently notice numbness/ tingling and burning in the left leg. Pain is getting worse with movement sometimes.  Patient denies any trauma to the area and denies any fever or chills. Today got worsening burning pain and almost fell over and got concerned for DVT so came to emergency room.    Denies any chest pain/ pressure, palpitation, any recent falls, hematuria/ or blood in stool.    IN ED  VITALS: /98, HR 88, RR 18, temp 98.6F  on RA   PERTINENT LABS:  WBC 10.52, ESR 21, K 3.4, LACTATE 3.2   LOWER EXT Doppler u/s: NEGATIVE for DVT LE (09 Oct 2022 23:33)      ---  HOSPITAL COURSE:   The patient was admitted to medicine and Zosyn was continued, vancomycin was discontinued.   ---  CONSULTANTS:   FERNANDO Ny   Podiatry    --  DAY OF DISCHARGE   VITALS/PE         ---  TIME SPENT:  The total amount of time spent reviewing the hospital notes, laboratory values, imaging findings, assessing/counseling the patient, discussing with consultant physicians, social work, nursing staff was -- minutes    ---  Primary care provider was made aware of plan for discharge:      [  ] NO     [  ] YES   FROM ADMISSION H+P:   HPI:  Patient is 67-year-old male with PMHx of MATT. fib on Eliquis, hypertension, hyperlipidemia,  prostate cancer s/p prostatectomy, CABG x2 stents, hx of chronic venous stasis  here for left lower leg wounds.  Patient states has had wounds ( left leg ) flaccid blisters, for about 4 months  getting worse now. Reports was seen by vascular Dr Huang Barakat, 4 months ago for lower extremity wounds and chronic venous stasis, compartment pressure was fine per patient. Reports he works as  , able to ambulate fine, but recently notice numbness/ tingling and burning in the left leg. Pain is getting worse with movement sometimes.  Patient denies any trauma to the area and denies any fever or chills. Today got worsening burning pain and almost fell over and got concerned for DVT so came to emergency room.    Denies any chest pain/ pressure, palpitation, any recent falls, hematuria/ or blood in stool.    IN ED  VITALS: /98, HR 88, RR 18, temp 98.6F  on RA   PERTINENT LABS:  WBC 10.52, ESR 21, K 3.4, LACTATE 3.2   LOWER EXT Doppler u/s: NEGATIVE for DVT LE (09 Oct 2022 23:33)      ---  HOSPITAL COURSE:   The patient was admitted to medicine and Zosyn was continued, vancomycin was discontinued. MRSA/MSSA pcr showed **  ---  CONSULTANTS:   FERNANDO Ny   Podiatry    --  DAY OF DISCHARGE   VITALS/PE         ---  TIME SPENT:  The total amount of time spent reviewing the hospital notes, laboratory values, imaging findings, assessing/counseling the patient, discussing with consultant physicians, social work, nursing staff was -- minutes    ---  Primary care provider was made aware of plan for discharge:      [  ] NO     [  ] YES   FROM ADMISSION H+P:   HPI:  Patient is 67-year-old male with PMHx of ARUN fib on Eliquis, hypertension, hyperlipidemia,  prostate cancer s/p prostatectomy, CABG x2 stents, hx of chronic venous stasis  here for left lower leg wounds.  Patient states has had wounds ( left leg ) flaccid blisters, for about 4 months  getting worse now. Reports was seen by vascular Dr Huang Barakat, 4 months ago for lower extremity wounds and chronic venous stasis, compartment pressure was fine per patient. Reports he works as  , able to ambulate fine, but recently notice numbness/ tingling and burning in the left leg. Pain is getting worse with movement sometimes.  Patient denies any trauma to the area and denies any fever or chills. Today got worsening burning pain and almost fell over and got concerned for DVT so came to emergency room.    Denies any chest pain/ pressure, palpitation, any recent falls, hematuria/ or blood in stool.    IN ED  VITALS: /98, HR 88, RR 18, temp 98.6F  on RA   PERTINENT LABS:  WBC 10.52, ESR 21, K 3.4, LACTATE 3.2   LOWER EXT Doppler u/s: NEGATIVE for DVT LE (09 Oct 2022 23:33)      ---  HOSPITAL COURSE:   The patient was admitted to medicine and Zosyn was continued, vancomycin was discontinued. MRSA/MSSA pcr was negative. The patient continued to report sensations of burning and tingling in the legs, likely chronic. He also developed some diarrhea and was given a probiotic.     CONSULTANTS:   FERNANDO Ny   Podiatry  Vascular-  Cristi   --  DAY OF DISCHARGE   VITALS/PE         ---  TIME SPENT:  The total amount of time spent reviewing the hospital notes, laboratory values, imaging findings, assessing/counseling the patient, discussing with consultant physicians, social work, nursing staff was -- minutes    ---  Primary care provider was made aware of plan for discharge:      [  ] NO     [  ] YES   FROM ADMISSION H+P:   HPI:  Patient is 67-year-old male with PMHx of A. fib on Eliquis, hypertension, hyperlipidemia,  prostate cancer s/p prostatectomy, CABG x2 stents, hx of chronic venous stasis  here for left lower leg wounds.  Patient states has had wounds ( left leg ) flaccid blisters, for about 4 months  getting worse now. Reports was seen by vascular Dr Huang Barakat, 4 months ago for lower extremity wounds and chronic venous stasis, compartment pressure was fine per patient. Reports he works as  , able to ambulate fine, but recently notice numbness/ tingling and burning in the left leg. Pain is getting worse with movement sometimes.  Patient denies any trauma to the area and denies any fever or chills. Today got worsening burning pain and almost fell over and got concerned for DVT so came to emergency room.    Denies any chest pain/ pressure, palpitation, any recent falls, hematuria/ or blood in stool.    IN ED  VITALS: /98, HR 88, RR 18, temp 98.6F  on RA   PERTINENT LABS:  WBC 10.52, ESR 21, K 3.4, LACTATE 3.2   LOWER EXT Doppler u/s: NEGATIVE for DVT LE (09 Oct 2022 23:33)      ---  HOSPITAL COURSE:   The patient was admitted to medicine and Zosyn was continued, vancomycin was discontinued. MRSA/MSSA pcr was negative. The patient continued to report sensations of burning and tingling in the legs, likely chronic. He also developed some diarrhea and was given a probiotic. Due to his reported chronic unsteadiness, MRI head, MRA head were performed with negative results. MRI lumbar spine was performed showing ___. b12, folate were __. TSH was __. lyme antibodies showed __. SPEP showed __.    CONSULTANTS:   FERNANDO Ny   Podiatry  Vascular-  Cristi   --  DAY OF DISCHARGE   VITALS/PE         ---  TIME SPENT:  The total amount of time spent reviewing the hospital notes, laboratory values, imaging findings, assessing/counseling the patient, discussing with consultant physicians, social work, nursing staff was -- minutes    ---  Primary care provider was made aware of plan for discharge:      [  ] NO     [  ] YES   FROM ADMISSION H+P:   HPI:  Patient is 67-year-old male with PMHx of ARUN fib on Eliquis, hypertension, hyperlipidemia,  prostate cancer s/p prostatectomy, CABG x2 stents, hx of chronic venous stasis  here for left lower leg wounds.  Patient states has had wounds ( left leg ) flaccid blisters, for about 4 months  getting worse now. Reports was seen by vascular Dr Huang Barakat, 4 months ago for lower extremity wounds and chronic venous stasis, compartment pressure was fine per patient. Reports he works as  , able to ambulate fine, but recently notice numbness/ tingling and burning in the left leg. Pain is getting worse with movement sometimes.  Patient denies any trauma to the area and denies any fever or chills. Today got worsening burning pain and almost fell over and got concerned for DVT so came to emergency room.    Denies any chest pain/ pressure, palpitation, any recent falls, hematuria/ or blood in stool.    IN ED  VITALS: /98, HR 88, RR 18, temp 98.6F  on RA   PERTINENT LABS:  WBC 10.52, ESR 21, K 3.4, LACTATE 3.2   LOWER EXT Doppler u/s: NEGATIVE for DVT LE (09 Oct 2022 23:33)      ---  HOSPITAL COURSE:   The patient was admitted to medicine and Zosyn was continued, vancomycin was discontinued. MRSA/MSSA pcr was negative. The patient continued to report sensations of burning and tingling in the legs, likely chronic. He also developed some diarrhea and was given a probiotic. Due to his reported chronic unsteadiness, MRI head, MRA head were performed with negative acute findings. MRI lumbar spine was performed showing degenerative changes but no acute infectious process. B12 and folate levels were found to be within normal limits. TSH was within normal limits. The patient was stable and medically optimized for discharge home with outpatient follow up.     CONSULTANTS:   ID - Anant   Podiatry  Vascular-  Cristi   Neurology- Finn     --  DAY OF DISCHARGE   VITALS/PE   T(C): 36.3 (10-14-22 @ 04:51), Max: 36.8 (10-13-22 @ 20:27)  HR: 69 (10-14-22 @ 04:51) (69 - 74)  BP: 158/87 (10-14-22 @ 04:51) (147/89 - 170/97)  RR: 18 (10-14-22 @ 04:51) (18 - 18)  SpO2: 95% (10-14-22 @ 04:51) (94% - 97%)    CONSTITUTIONAL: Well groomed, no apparent distress  ENMT: Oral mucosa with moist membranes. Normal dentition; no pharyngeal injection or exudates  RESP: No respiratory distress, no use of accessory muscles; CTA b/l, no WRR  CV: RRR, +S1S2, no MRG; no JVD; no peripheral edema  GI: Soft, NT, ND, no rebound, no guarding; no palpable masses; no hepatosplenomegaly; no hernia palpated  MSK: Normal gait and strength   SKIN: B/l venous stasis changes in the LE. LLE wound wrapped in dressing, c/d/i  NEURO: CN II-XII intact; normal reflexes in upper and lower extremities, sensation intact in upper and lower extremities b/l to light touch   PSYCH: Appropriate insight/judgment; A+O x 3, mood and affect appropriate, recent/remote memory intact      ---  TIME SPENT:  The total amount of time spent reviewing the hospital notes, laboratory values, imaging findings, assessing/counseling the patient, discussing with consultant physicians, social work, nursing staff was -- minutes    ---  Primary care provider was made aware of plan for discharge:      [  ] NO     [  ] YES   FROM ADMISSION H+P:   HPI:  Patient is 67-year-old male with PMHx of ARUN fib on Eliquis, hypertension, hyperlipidemia,  prostate cancer s/p prostatectomy, CABG x2 stents, hx of chronic venous stasis  here for left lower leg wounds.  Patient states has had wounds ( left leg ) flaccid blisters, for about 4 months  getting worse now. Reports was seen by vascular Dr Huang Barakat, 4 months ago for lower extremity wounds and chronic venous stasis, compartment pressure was fine per patient. Reports he works as  , able to ambulate fine, but recently notice numbness/ tingling and burning in the left leg. Pain is getting worse with movement sometimes.  Patient denies any trauma to the area and denies any fever or chills. Today got worsening burning pain and almost fell over and got concerned for DVT so came to emergency room.    Denies any chest pain/ pressure, palpitation, any recent falls, hematuria/ or blood in stool.    IN ED  VITALS: /98, HR 88, RR 18, temp 98.6F  on RA   PERTINENT LABS:  WBC 10.52, ESR 21, K 3.4, LACTATE 3.2   LOWER EXT Doppler u/s: NEGATIVE for DVT LE (09 Oct 2022 23:33)      ---  HOSPITAL COURSE:   The patient was admitted to medicine and Zosyn was continued, vancomycin was discontinued. MRSA/MSSA pcr was negative. The patient continued to report sensations of burning and tingling in the legs, likely chronic. He also developed some diarrhea and was given a probiotic. Due to his reported chronic unsteadiness, MRI head, MRA head were performed with negative acute findings. MRI lumbar spine was performed showing degenerative changes but no acute infectious process. B12 and folate levels were found to be within normal limits. TSH was within normal limits. The patient was stable and medically optimized for discharge home with outpatient follow up.     CONSULTANTS:   ID - Anant   Podiatry  Vascular-  Cristi   Neurology- Finn     --  DAY OF DISCHARGE   VITALS/PE   T(C): 36.3 (10-14-22 @ 04:51), Max: 36.8 (10-13-22 @ 20:27)  HR: 69 (10-14-22 @ 04:51) (69 - 74)  BP: 158/87 (10-14-22 @ 04:51) (147/89 - 170/97)  RR: 18 (10-14-22 @ 04:51) (18 - 18)  SpO2: 95% (10-14-22 @ 04:51) (94% - 97%)    CONSTITUTIONAL: Well groomed, no apparent distress  ENMT: Oral mucosa with moist membranes. Normal dentition; no pharyngeal injection or exudates  RESP: No respiratory distress, no use of accessory muscles; CTA b/l, no WRR  CV: RRR, +S1S2, no MRG; no JVD; no peripheral edema  GI: Soft, NT, ND, no rebound, no guarding; no palpable masses; no hepatosplenomegaly; no hernia palpated  MSK: Normal gait and strength   SKIN: B/l venous stasis changes in the LE. LLE wound wrapped in dressing, c/d/i  NEURO: CN II-XII intact; normal reflexes in upper and lower extremities, sensation intact in upper and lower extremities b/l to light touch   PSYCH: Appropriate insight/judgment; A+O x 3, mood and affect appropriate, recent/remote memory intact      ---  TIME SPENT:  The total amount of time spent reviewing the hospital notes, laboratory values, imaging findings, assessing/counseling the patient, discussing with consultant physicians, social work, nursing staff was 33 minutes   FROM ADMISSION H+P:   HPI:  Patient is 67-year-old male with PMHx of ARUN fib on Eliquis, hypertension, hyperlipidemia,  prostate cancer s/p prostatectomy, CABG x2 stents, hx of chronic venous stasis  here for left lower leg wounds.  Patient states has had wounds ( left leg ) flaccid blisters, for about 4 months  getting worse now. Reports was seen by vascular Dr Huang Barakat, 4 months ago for lower extremity wounds and chronic venous stasis, compartment pressure was fine per patient. Reports he works as  , able to ambulate fine, but recently notice numbness/ tingling and burning in the left leg. Pain is getting worse with movement sometimes.  Patient denies any trauma to the area and denies any fever or chills. Today got worsening burning pain and almost fell over and got concerned for DVT so came to emergency room.    Denies any chest pain/ pressure, palpitation, any recent falls, hematuria/ or blood in stool.    IN ED  VITALS: /98, HR 88, RR 18, temp 98.6F  on RA   PERTINENT LABS:  WBC 10.52, ESR 21, K 3.4, LACTATE 3.2   LOWER EXT Doppler u/s: NEGATIVE for DVT LE (09 Oct 2022 23:33)      ---  HOSPITAL COURSE:   The patient was admitted to medicine and Zosyn was continued, vancomycin was discontinued. MRSA/MSSA pcr was negative. The patient continued to report sensations of burning and tingling in the legs, likely chronic. He also developed some diarrhea and was given a probiotic. Due to his reported chronic unsteadiness, MRI head, MRA head were performed with negative acute findings. MRI lumbar spine was performed showing degenerative changes but no acute infectious process. B12 and folate levels were found to be within normal limits. TSH was within normal limits. The patient was stable and medically optimized for discharge home with outpatient follow up.     CONSULTANTS:   ID - Anant   Podiatry  Vascular-  Cristi   Neurology- Finn     --  DAY OF DISCHARGE   VITALS/PE   T(C): 36.4 (10-15-22 @ 05:32), Max: 36.5 (10-14-22 @ 20:41)  HR: 68 (10-15-22 @ 05:32) (59 - 68)  BP: 134/95 (10-15-22 @ 05:32) (134/95 - 164/114)  RR: 19 (10-15-22 @ 05:32) (19 - 19)  SpO2: 93% (10-15-22 @ 05:32) (93% - 99%)    CONSTITUTIONAL: Well groomed, no apparent distress  ENMT: Oral mucosa with moist membranes. Normal dentition; no pharyngeal injection or exudates  RESP: No respiratory distress, no use of accessory muscles; CTA b/l, no WRR  CV: RRR, +S1S2, no MRG; no JVD; no peripheral edema  GI: Soft, NT, ND, no rebound, no guarding; no palpable masses; no hepatosplenomegaly; no hernia palpated  MSK: Normal gait and strength   SKIN: B/l venous stasis changes in the LE. LLE wound wrapped in dressing, c/d/i  NEURO: CN II-XII intact; normal reflexes in upper and lower extremities, sensation intact in upper and lower extremities b/l to light touch   PSYCH: Appropriate insight/judgment; A+O x 3, mood and affect appropriate, recent/remote memory intact      ---  TIME SPENT:  The total amount of time spent reviewing the hospital notes, laboratory values, imaging findings, assessing/counseling the patient, discussing with consultant physicians, social work, nursing staff was 33 minutes   FROM ADMISSION H+P:   HPI:  Patient is 67-year-old male with PMHx of ARUN fib on Eliquis, hypertension, hyperlipidemia,  prostate cancer s/p prostatectomy, CABG x2 stents, hx of chronic venous stasis  here for left lower leg wounds.  Patient states has had wounds ( left leg ) flaccid blisters, for about 4 months  getting worse now. Reports was seen by vascular Dr Huang Barakat, 4 months ago for lower extremity wounds and chronic venous stasis, compartment pressure was fine per patient. Reports he works as  , able to ambulate fine, but recently notice numbness/ tingling and burning in the left leg. Pain is getting worse with movement sometimes.  Patient denies any trauma to the area and denies any fever or chills. Today got worsening burning pain and almost fell over and got concerned for DVT so came to emergency room.    Denies any chest pain/ pressure, palpitation, any recent falls, hematuria/ or blood in stool.    IN ED  VITALS: /98, HR 88, RR 18, temp 98.6F  on RA   PERTINENT LABS:  WBC 10.52, ESR 21, K 3.4, LACTATE 3.2   LOWER EXT Doppler u/s: NEGATIVE for DVT LE (09 Oct 2022 23:33)      ---  HOSPITAL COURSE:   The patient was admitted to medicine and Zosyn was continued, vancomycin was discontinued. MRSA/MSSA pcr was negative. The patient continued to report sensations of burning and tingling in the legs, likely chronic. He also developed some diarrhea and was given a probiotic. Due to his reported chronic unsteadiness, MRI head, MRA head were performed with negative acute findings. MRI lumbar spine was performed showing degenerative changes but no acute infectious process. B12 and folate levels were found to be within normal limits. TSH was within normal limits. The patient was stable and medically optimized for discharge home with outpatient follow up.     CONSULTANTS:   ID - Anant   Podiatry  Vascular-  Cristi   Neurology- Finn     --  DAY OF DISCHARGE   VITALS/PE   T(C): 36.4 (10-15-22 @ 05:32), Max: 36.5 (10-14-22 @ 20:41)  HR: 68 (10-15-22 @ 05:32) (59 - 68)  BP: 134/95 (10-15-22 @ 05:32) (134/95 - 164/114)  RR: 19 (10-15-22 @ 05:32) (19 - 19)  SpO2: 93% (10-15-22 @ 05:32) (93% - 99%)    CONSTITUTIONAL: Well groomed, no apparent distress  ENMT: Oral mucosa with moist membranes. Normal dentition; no pharyngeal injection or exudates  RESP: No respiratory distress, no use of accessory muscles; CTA b/l, no WRR  CV: RRR, +S1S2, no MRG; no JVD; no peripheral edema  GI: Soft, NT, ND, no rebound, no guarding; no palpable masses; no hepatosplenomegaly; no hernia palpated  MSK: Normal gait and strength   SKIN: B/l venous stasis changes in the LE. LLE wound wrapped in dressing, c/d/i  NEURO: CN II-XII intact; normal reflexes in upper and lower extremities, sensation intact in upper and lower extremities b/l to light touch   PSYCH: Appropriate insight/judgment; A+O x 3, mood and affect appropriate, recent/remote memory intact      ---  TIME SPENT: 40min

## 2022-10-11 LAB
ALBUMIN SERPL ELPH-MCNC: 3.7 G/DL — SIGNIFICANT CHANGE UP (ref 3.3–5)
ALP SERPL-CCNC: 78 U/L — SIGNIFICANT CHANGE UP (ref 40–120)
ALT FLD-CCNC: 30 U/L — SIGNIFICANT CHANGE UP (ref 12–78)
ANION GAP SERPL CALC-SCNC: 7 MMOL/L — SIGNIFICANT CHANGE UP (ref 5–17)
AST SERPL-CCNC: 21 U/L — SIGNIFICANT CHANGE UP (ref 15–37)
BASOPHILS # BLD AUTO: 0.04 K/UL — SIGNIFICANT CHANGE UP (ref 0–0.2)
BASOPHILS NFR BLD AUTO: 0.5 % — SIGNIFICANT CHANGE UP (ref 0–2)
BILIRUB SERPL-MCNC: 1.1 MG/DL — SIGNIFICANT CHANGE UP (ref 0.2–1.2)
BUN SERPL-MCNC: 24 MG/DL — HIGH (ref 7–23)
CALCIUM SERPL-MCNC: 9.7 MG/DL — SIGNIFICANT CHANGE UP (ref 8.5–10.1)
CHLORIDE SERPL-SCNC: 104 MMOL/L — SIGNIFICANT CHANGE UP (ref 96–108)
CO2 SERPL-SCNC: 31 MMOL/L — SIGNIFICANT CHANGE UP (ref 22–31)
CREAT SERPL-MCNC: 1.2 MG/DL — SIGNIFICANT CHANGE UP (ref 0.5–1.3)
EGFR: 66 ML/MIN/1.73M2 — SIGNIFICANT CHANGE UP
EOSINOPHIL # BLD AUTO: 0.36 K/UL — SIGNIFICANT CHANGE UP (ref 0–0.5)
EOSINOPHIL NFR BLD AUTO: 4.1 % — SIGNIFICANT CHANGE UP (ref 0–6)
GLUCOSE SERPL-MCNC: 111 MG/DL — HIGH (ref 70–99)
HCT VFR BLD CALC: 41.7 % — SIGNIFICANT CHANGE UP (ref 39–50)
HGB BLD-MCNC: 14.7 G/DL — SIGNIFICANT CHANGE UP (ref 13–17)
IMM GRANULOCYTES NFR BLD AUTO: 0.3 % — SIGNIFICANT CHANGE UP (ref 0–0.9)
LYMPHOCYTES # BLD AUTO: 2.19 K/UL — SIGNIFICANT CHANGE UP (ref 1–3.3)
LYMPHOCYTES # BLD AUTO: 25 % — SIGNIFICANT CHANGE UP (ref 13–44)
MAGNESIUM SERPL-MCNC: 2.3 MG/DL — SIGNIFICANT CHANGE UP (ref 1.6–2.6)
MCHC RBC-ENTMCNC: 31.8 PG — SIGNIFICANT CHANGE UP (ref 27–34)
MCHC RBC-ENTMCNC: 35.3 GM/DL — SIGNIFICANT CHANGE UP (ref 32–36)
MCV RBC AUTO: 90.3 FL — SIGNIFICANT CHANGE UP (ref 80–100)
MONOCYTES # BLD AUTO: 0.89 K/UL — SIGNIFICANT CHANGE UP (ref 0–0.9)
MONOCYTES NFR BLD AUTO: 10.2 % — SIGNIFICANT CHANGE UP (ref 2–14)
MRSA PCR RESULT.: SIGNIFICANT CHANGE UP
NEUTROPHILS # BLD AUTO: 5.25 K/UL — SIGNIFICANT CHANGE UP (ref 1.8–7.4)
NEUTROPHILS NFR BLD AUTO: 59.9 % — SIGNIFICANT CHANGE UP (ref 43–77)
NRBC # BLD: 0 /100 WBCS — SIGNIFICANT CHANGE UP (ref 0–0)
PLATELET # BLD AUTO: 258 K/UL — SIGNIFICANT CHANGE UP (ref 150–400)
POTASSIUM SERPL-MCNC: 3.6 MMOL/L — SIGNIFICANT CHANGE UP (ref 3.5–5.3)
POTASSIUM SERPL-SCNC: 3.6 MMOL/L — SIGNIFICANT CHANGE UP (ref 3.5–5.3)
PROT SERPL-MCNC: 7.9 G/DL — SIGNIFICANT CHANGE UP (ref 6–8.3)
RBC # BLD: 4.62 M/UL — SIGNIFICANT CHANGE UP (ref 4.2–5.8)
RBC # FLD: 13.4 % — SIGNIFICANT CHANGE UP (ref 10.3–14.5)
S AUREUS DNA NOSE QL NAA+PROBE: SIGNIFICANT CHANGE UP
SODIUM SERPL-SCNC: 142 MMOL/L — SIGNIFICANT CHANGE UP (ref 135–145)
WBC # BLD: 8.76 K/UL — SIGNIFICANT CHANGE UP (ref 3.8–10.5)
WBC # FLD AUTO: 8.76 K/UL — SIGNIFICANT CHANGE UP (ref 3.8–10.5)

## 2022-10-11 PROCEDURE — 99233 SBSQ HOSP IP/OBS HIGH 50: CPT

## 2022-10-11 PROCEDURE — 99232 SBSQ HOSP IP/OBS MODERATE 35: CPT | Mod: GC

## 2022-10-11 RX ORDER — INFLUENZA VIRUS VACCINE 15; 15; 15; 15 UG/.5ML; UG/.5ML; UG/.5ML; UG/.5ML
0.7 SUSPENSION INTRAMUSCULAR ONCE
Refills: 0 | Status: DISCONTINUED | OUTPATIENT
Start: 2022-10-11 | End: 2022-10-15

## 2022-10-11 RX ADMIN — Medication 1 TABLET(S): at 13:09

## 2022-10-11 RX ADMIN — Medication 650 MILLIGRAM(S): at 17:06

## 2022-10-11 RX ADMIN — APIXABAN 5 MILLIGRAM(S): 2.5 TABLET, FILM COATED ORAL at 06:58

## 2022-10-11 RX ADMIN — GABAPENTIN 100 MILLIGRAM(S): 400 CAPSULE ORAL at 13:09

## 2022-10-11 RX ADMIN — Medication 100 MILLIGRAM(S): at 06:57

## 2022-10-11 RX ADMIN — ATORVASTATIN CALCIUM 40 MILLIGRAM(S): 80 TABLET, FILM COATED ORAL at 22:13

## 2022-10-11 RX ADMIN — Medication 650 MILLIGRAM(S): at 17:59

## 2022-10-11 RX ADMIN — PIPERACILLIN AND TAZOBACTAM 25 GRAM(S): 4; .5 INJECTION, POWDER, LYOPHILIZED, FOR SOLUTION INTRAVENOUS at 06:58

## 2022-10-11 RX ADMIN — GABAPENTIN 100 MILLIGRAM(S): 400 CAPSULE ORAL at 22:13

## 2022-10-11 RX ADMIN — APIXABAN 5 MILLIGRAM(S): 2.5 TABLET, FILM COATED ORAL at 17:01

## 2022-10-11 RX ADMIN — Medication 81 MILLIGRAM(S): at 13:08

## 2022-10-11 RX ADMIN — LOSARTAN POTASSIUM 100 MILLIGRAM(S): 100 TABLET, FILM COATED ORAL at 06:57

## 2022-10-11 RX ADMIN — PIPERACILLIN AND TAZOBACTAM 25 GRAM(S): 4; .5 INJECTION, POWDER, LYOPHILIZED, FOR SOLUTION INTRAVENOUS at 22:10

## 2022-10-11 RX ADMIN — Medication 25 MILLIGRAM(S): at 06:58

## 2022-10-11 RX ADMIN — Medication 0.3 MILLIGRAM(S): at 22:13

## 2022-10-11 RX ADMIN — PIPERACILLIN AND TAZOBACTAM 25 GRAM(S): 4; .5 INJECTION, POWDER, LYOPHILIZED, FOR SOLUTION INTRAVENOUS at 13:09

## 2022-10-11 RX ADMIN — GABAPENTIN 100 MILLIGRAM(S): 400 CAPSULE ORAL at 06:58

## 2022-10-11 NOTE — PROGRESS NOTE ADULT - PROBLEM SELECTOR PLAN 1
- Hx of chronic venous stasis ,here with left lower leg wounds.  + numbness,  tingling and burning pain in the left leg.  Patient presents with erythema, +1 edema, and pain likely 2/2 LLE cellulitis  - hx of left leg flaccid blisters--> ruptured  for about 4 months, getting worse now.   - ESR 21, leucocytosis 10k downtrending to 8.76(10/11/22), afebrile , doesn't meet SIRS criteria    - BCx shows no growth to date  - Continue Zosyn 3.375 gm Q8h.   - LE doppler : negative for DVT   - X Ray LE: negative for fracture or dislocation   - Tylenol 650 mg PO q6h PRN for mild pain or fever  - f/u Ucx, MRSA, MSSA PCR   - Monitor WBC and fever curve  - ID Dr. Ny consulted, reccs tab  - Consulted wound care, follow rec   - PT consulted, recs appreciated  - pod consult  - patient follow out patient vascular Dr Huang conn , appointment next month - Hx of chronic venous stasis ,here with left lower leg wounds.  + numbness,  tingling and burning pain in the left leg.  Patient presents with erythema, +1 edema, and pain likely 2/2 LLE cellulitis  - hx of left leg flaccid blisters--> ruptured  for about 4 months, getting worse now.   - ESR 21, leucocytosis 10k downtrending to 8.76(10/11/22), afebrile , doesn't meet SIRS criteria    - BCx shows no growth to date  - Continue Zosyn 3.375 gm Q8h.   - LE doppler : negative for DVT   - X Ray LE: negative for fracture or dislocation   - Tylenol 650 mg PO q6h PRN for mild pain or fever  - f/u MRSA, MSSA PCR   - Monitor WBC and fever curve  - ID Dr. Ny consulted, reccs tab  - Consulted wound care, follow rec   - PT consulted, recs appreciated  - pod consult  - patient follow out patient vascular Dr Huang conn , appointment next month - Hx of chronic venous stasis ,here with left lower leg wounds, likely 2/2 LLE cellulitis  - ESR 21, leucocytosis 10k downtrending to 8.76(10/11/22), afebrile , doesn't meet SIRS criteria    - BCx shows no growth to date  - Continue Zosyn 3.375 gm Q8h.   - LE doppler : negative for DVT   - X Ray LE: negative for fracture or dislocation   - Tylenol 650 mg PO q6h PRN for mild pain or fever  - f/u MRSA, MSSA PCR   - Monitor WBC and fever curve  - ID Dr. Ny consulted, reccs tab  - Consulted wound care, follow rec   - PT consulted, recs appreciated  - pod consult, follow rec  - patient follow out patient vascular Dr Huang conn , appointment next month - Hx of chronic venous stasis ,here with left lower leg wounds, likely 2/2 LLE cellulitis  - Leucocytosis 10k downtrending to 8.76(10/11/22), afebrile , doesn't meet SIRS criteria    - BCx shows no growth to date  - Continue Zosyn 3.375 gm Q8h.   - Tylenol 650 mg PO q6h PRN for mild pain or fever  - f/u MRSA, MSSA PCR   - Monitor WBC and fever curve  - ID Dr. Ny consulted, reccs tab  - Consulted wound care, follow rec   - PT consulted, recs appreciated  - pod consult, follow rec

## 2022-10-11 NOTE — ED ADULT NURSE REASSESSMENT NOTE - PATIENT ON (OXYGEN DELIVERY METHOD)
room air Chuck Comer), Family Medicine  [address] [place] [address]  Phone: [phone]  Fax: [phone]

## 2022-10-11 NOTE — CONSULT NOTE ADULT - SUBJECTIVE AND OBJECTIVE BOX
[facility]  INFECTIOUS DISEASES   [address] [place] [address]  Tel: [phone]     Fax: [phone]  ======================================================  MD Wilmer Boston Kaushal, MD Cho, Michelle, MD   ======================================================    MRN-[id number]  DELIA AMIN     CC: LLE cellulitis     HPI:  66yo man with PMH of HTN, CAD s/p CABG, A. fib, prostate cancer s/p prostatectomy, and chronic venous stasis was admitted with leg ulcers and cellulitis.   He had blisters and swelling in legs that opened up causing ulcers that is going on for about 6months. Seen vascular doctor in the past stating that had good arterial circulation.   Feels burning pain in both legs. No fever or chills. Not seen in wound center.      PAST MEDICAL & SURGICAL HISTORY:  Atrial fibrillation, unspecified type  Hypertension  HLD (hyperlipidemia)  Prostate cancer  H/O prostatectomy  Presence of stent in artery    Social Hx: no smoking, ETOH Or drugs     FAMILY HISTORY:  No pertinent family history in first degree relatives    Allergies  No Known Allergies    Antibiotics:  MEDICATIONS  (STANDING):  apixaban 5 milliGRAM(s) Oral every 12 hours  aspirin enteric coated 81 milliGRAM(s) Oral daily  atorvastatin 40 milliGRAM(s) Oral at bedtime  cloNIDine 0.3 milliGRAM(s) Oral at bedtime  gabapentin 100 milliGRAM(s) Oral every 8 hours  hydrochlorothiazide 25 milliGRAM(s) Oral daily  losartan 100 milliGRAM(s) Oral daily  multivitamin 1 Tablet(s) Oral daily  piperacillin/tazobactam IVPB.. 3.375 Gram(s) IV Intermittent every 8 hours  vancomycin  IVPB 1250 milliGRAM(s) IV Intermittent every 12 hours     REVIEW OF SYSTEMS:  CONSTITUTIONAL:  No Fever or chills  HEENT:  No diplopia or blurred vision.  No sore throat or runny nose.  CARDIOVASCULAR:  No chest pain or SOB.  RESPIRATORY:  No cough, shortness of breath, PND or orthopnea.  GASTROINTESTINAL:  No nausea, vomiting or diarrhea.  GENITOURINARY:  No dysuria, frequency or urgency. No Blood in urine  MUSCULOSKELETAL:  leg swelling and ulcers   SKIN:  leg ulcers   NEUROLOGIC:  No paresthesias, fasciculations, seizures or weakness.  PSYCHIATRIC:  No disorder of thought or mood.  ENDOCRINE:  No heat or cold intolerance, polyuria or polydipsia.  HEMATOLOGICAL:  No easy bruising or bleeding.     Physical Exam:  Vital Signs Last 24 Hrs  T(C): 36.3 (10 Oct 2022 05:41), Max: 37 (09 Oct 2022 18:25)  T(F): 97.3 (10 Oct 2022 05:41), Max: 98.6 (09 Oct 2022 18:25)  HR: 73 (10 Oct 2022 05:41) (73 - 88)  BP: 155/99 (10 Oct 2022 05:41) (155/99 - 180/98)  BP(mean): --  RR: 18 (10 Oct 2022 05:41) (18 - 18)  SpO2: 97% (10 Oct 2022 05:41) (97% - 97%)  Parameters below as of 09 Oct 2022 18:25  Patient On (Oxygen Delivery Method): room air  Height (cm): 177.8 (10-09 @ 18:25)  Weight (kg): 93 (10-09 @ 18:25)  BMI (kg/m2): 29.4 (10-09 @ 18:25)  BSA (m2): 2.11 (10-09 @ 18:25)  GEN: NAD, obese   HEENT: normocephalic and atraumatic. EOMI. PERRL.    NECK: Supple.  No lymphadenopathy   LUNGS: Clear to auscultation.  HEART: Regular rate and rhythm without murmur.  ABDOMEN: Soft, nontender, and nondistended.  Positive bowel sounds.    : No CVA tenderness  EXTREMITIES: Chronic skin changes in both lower legs  Left leg with multiple dry ulcers, below knee leg is swollen with warmth  erythema and tenderness, no purulent discharge or fluctuation.    NEUROLOGIC: grossly intact.  PSYCHIATRIC: Appropriate affect .  SKIN: No rash, legs as above     Labs:  10-09    143  |  105  |  23  ----------------------------<  92  3.4<L>   |  29  |  1.20    Ca    9.5      09 Oct 2022 19:17    TPro  8.4<H>  /  Alb  4.0  /  TBili  1.1  /  DBili  x   /  AST  20  /  ALT  35  /  AlkPhos  83  10-09                        14.3   9.17  )-----------( 236      ( 10 Oct 2022 09:37 )             40.4     LIVER FUNCTIONS - ( 09 Oct 2022 19:17 )  Alb: 4.0 g/dL / Pro: 8.4 g/dL / ALK PHOS: 83 U/L / ALT: 35 U/L / AST: 20 U/L / GGT: x           C-Reactive Protein, Serum: 6 mg/L (10-09-22 @ 19:17)    Sedimentation Rate, Erythrocyte: 21 mm/hr (10-09-22 @ 19:17)    COVID-19 PCR: NotDetec (10-09-22 @ 23:09)    All imaging and other data have been reviewed.  < from: US Duplex Venous Lower Ext Ltd, Left (10.09.22 @ 20:31) >  IMPRESSION:  No evidence of left lower extremity deep venous thrombosis.    Assessment and Plan:   66yo man with PMH of HTN, CAD s/p CABG, A. fib, prostate cancer s/p prostatectomy, and chronic venous stasis was admitted with leg ulcers and cellulitis.   He had blisters and swelling in legs that opened up causing ulcers that is going on for about 6months. Seen vascular doctor in the past stating that had good arterial circulation.   Feels burning pain in both legs. No fever or chills. Not seen in wound center.      Recommendations:  - Blood cultures are done, will follow   - MRSA PCR  - Vascular consult   - Wound care consult, needs follow up in wound center after discharge   - Continue zosyn 3.375gm q8  - Stop vancomycin     Thank you for courtesy of this consult.     Will follow.  Discussed with the primary team.     Lupe Ny MD  Division of Infectious Diseases   Please call ID service at [phone] with any question.      55 minutes spent on total encounter assessing patient, examination, chart review, counseling and coordinating care by the attending physician/nurse/care manager.  
HPI:  Podiatry consult for a 66yo male with PMHx of A. fib on Eliquis, hypertension, hyperlipidemia,  prostate cancer s/p prostatectomy, CABG x2 stents, hx of chronic venous stasis with bilateral LE venous stasis changes and concomitant ulcerations. Patient relates he has had the venous stasis changes for approximately the past year with wounds forming over the last few months off and on. Pt relates he came in on Joe 10/9/22 due to the burning pain, swelling of b/l LE with concern for possible blood clot, and draining wounds. On admission, WBC 10.5, ESR 21, CRP 6. Did not meet SIRS criteria.  Pt reports that he was seen by vascular Dr. Huang Barakat of [facility], 4 months ago for lower extremity wounds and chronic venous stasis, compartment pressure was fine per patient. Reports he works as  and was involved with 9/11. Pt is able to ambulate fine, but recently notice paraestheias and burning in b/l LE, left > right. Pain is getting worse with movement sometimes.  Patient denies any trauma to the area. Patient relates he has been on IV abx since admission and the wounds have stopped draining. Vdup negative for DVT. No osseous pathology of note on Tib/Fib XRs  Denies any chest pain/ pressure, palpitation, any recent falls, hematuria/ or blood in stool.    IN ED  VITALS: /98, HR 88, RR 18, temp 98.6F  on RA   PERTINENT LABS:  WBC 10.52, ESR 21, K 3.4, LACTATE 3.2   LOWER EXT Doppler u/s: NEGATIVE for DVT LE (09 Oct 2022 23:33)      67y year old Male seen at [facility] T[facility] D1 for ----------.  Denies any fever, chills, nausea, vomiting, chest pain, shortness of breath, or calf pain at this time.    REVIEW OF SYSTEMS    PAST MEDICAL & SURGICAL HISTORY:  Atrial fibrillation, unspecified type      Hypertension      HLD (hyperlipidemia)      Prostate cancer      H/O prostatectomy      Presence of stent in artery          Allergies    No Known Allergies    Intolerances        MEDICATIONS  (STANDING):  apixaban 5 milliGRAM(s) Oral every 12 hours  aspirin enteric coated 81 milliGRAM(s) Oral daily  atorvastatin 40 milliGRAM(s) Oral at bedtime  cloNIDine 0.3 milliGRAM(s) Oral at bedtime  gabapentin 100 milliGRAM(s) Oral every 8 hours  hydrochlorothiazide 25 milliGRAM(s) Oral daily  influenza  Vaccine (HIGH DOSE) 0.7 milliLiter(s) IntraMuscular once  losartan 100 milliGRAM(s) Oral daily  metoprolol succinate  milliGRAM(s) Oral daily  multivitamin 1 Tablet(s) Oral daily  piperacillin/tazobactam IVPB.. 3.375 Gram(s) IV Intermittent every 8 hours    MEDICATIONS  (PRN):  acetaminophen     Tablet .. 650 milliGRAM(s) Oral every 6 hours PRN Temp greater or equal to 38C (100.4F), Mild Pain (1 - 3)  aluminum hydroxide/magnesium hydroxide/simethicone Suspension 30 milliLiter(s) Oral every 4 hours PRN Dyspepsia  melatonin 3 milliGRAM(s) Oral at bedtime PRN Insomnia  ondansetron Injectable 4 milliGRAM(s) IV Push every 8 hours PRN Nausea and/or Vomiting      Social History:  Tobacco:  DENIES   EtOH:  DENIES  Recreational drug use: DENIES   Lives with: Family   Ambulates: independently   Vaccinations: Fully COVID- 19 vaccinated, work as  (09 Oct 2022 23:33)      FAMILY HISTORY:  No pertinent family history in first degree relatives        Vital Signs Last 24 Hrs  T(C): 36.3 (11 Oct 2022 11:47), Max: 36.7 (10 Oct 2022 16:33)  T(F): 97.4 (11 Oct 2022 11:47), Max: 98.1 (10 Oct 2022 16:33)  HR: 65 (11 Oct 2022 11:47) (65 - 89)  BP: 162/87 (11 Oct 2022 11:47) (148/87 - 189/89)  BP(mean): --  RR: 18 (11 Oct 2022 11:47) (18 - 18)  SpO2: 95% (11 Oct 2022 11:47) (95% - 100%)    Parameters below as of 11 Oct 2022 11:47  Patient On (Oxygen Delivery Method): room air        PHYSICAL EXAM:  Vascular: DP & PT palpable bilaterally, Capillary refill 3 seconds, Digital hair scant bilaterally, mild edema to b/l LE  Neurological: Light touch sensation intact bilaterally  Musculoskeletal: 5/5 strength in all quadrants bilaterally, AJ & STJ ROM intact  Dermatological: Multiple dried ulceration noted to the anterior and medial aspect of the LLE wit surrounding venous stasis dermatitis changes,  no periwound erythema, no fluctuance, no malodor, no proximal streaking at this time. No signs of infection at this time. RLE with venous stasis dermatitis changes, no open wounds.    CBC Full  -  ( 11 Oct 2022 05:44 )  WBC Count : 8.76 K/uL  RBC Count : 4.62 M/uL  Hemoglobin : 14.7 g/dL  Hematocrit : 41.7 %  Platelet Count - Automated : 258 K/uL  Mean Cell Volume : 90.3 fl  Mean Cell Hemoglobin : 31.8 pg  Mean Cell Hemoglobin Concentration : 35.3 gm/dL  Auto Neutrophil # : 5.25 K/uL  Auto Lymphocyte # : 2.19 K/uL  Auto Monocyte # : 0.89 K/uL  Auto Eosinophil # : 0.36 K/uL  Auto Basophil # : 0.04 K/uL  Auto Neutrophil % : 59.9 %  Auto Lymphocyte % : 25.0 %  Auto Monocyte % : 10.2 %  Auto Eosinophil % : 4.1 %  Auto Basophil % : 0.5 %                            14.7   8.76  )-----------( 258      ( 11 Oct 2022 05:44 )             41.7   10-11    142  |  104  |  24<H>  ----------------------------<  111<H>  3.6   |  31  |  1.20    Ca    9.7      11 Oct 2022 05:44  Mg     2.3     10-11    TPro  7.9  /  Alb  3.7  /  TBili  1.1  /  DBili  x   /  AST  21  /  ALT  30  /  AlkPhos  78  10-11            Culture - Blood (collected 09 Oct 2022 19:22)  Source: .Blood Blood-Peripheral  Preliminary Report (11 Oct 2022 02:01):    No growth to date.    Culture - Blood (collected 09 Oct 2022 19:17)  Source: .Blood Blood-Peripheral  Preliminary Report (11 Oct 2022 02:01):    No growth to date.      ACC: [id number] EXAM: XR TIB FIB AP LAT 2 VIEWS LT    PROCEDURE DATE: 10/09/2022        INTERPRETATION: Radiographs of the LEFT tibia and fibula    CLINICAL INFORMATION: Injury with Pain.    TECHNIQUE: Frontal and lateral views of the tibia and fibula were obtained.    FINDINGS: No prior studies are available for review.    The tibia and fibula are intact.  No fracture or gross osseous lytic/ blastic lesion..  No soft tissue abnormality.    IMPRESSION: No acute radiographic osseous pathology..    --- End of Report ---      ACC: [id number] EXAM: US DPLX LWR EXT VEINS LTD LT    PROCEDURE DATE: 10/09/2022        INTERPRETATION: CLINICAL INFORMATION: Pain and swelling of the lower extremity    COMPARISON: 2/11/2019.    TECHNIQUE: Duplex sonography of the LEFT LOWER extremity veins with color and spectral Doppler, with and without compression.    FINDINGS:    There is normal compressibility of the left common femoral, femoral and popliteal veins.  The contralateral common femoral vein is patent.  Doppler examination shows normal spontaneous and phasic flow.    No calf vein thrombosis is detected.    IMPRESSION:  No evidence of left lower extremity deep venous thrombosis.          --- End of Report ---

## 2022-10-11 NOTE — CONSULT NOTE ADULT - PROBLEM SELECTOR RECOMMENDATION 9
Pt evaluated and chart reviewed.  - Pt seen bedside in room, laying comfortably with legs elevated.  - b/l with venous stasis changes, wound are completely dry on exam today. Pt relates they began to improve and stopped draining once the IV abx were started in the ED on Joe 10/9/22.  - Vdup - neg for DVT, TibFibXR - neg for osseous pathology  - Protective Aquacell and DSD applied to LLE. Wound culture not obtained due to dry nature of the wounds during initial encounter.  - C/w IV antibiotics at this time  - Recommend vascular consult and workup    Podiatry to follow

## 2022-10-11 NOTE — PROGRESS NOTE ADULT - PROBLEM SELECTOR PLAN 3
- chronic  - at home BP meds, losartan/HCTZ 100mg-25mg daily , clonidine 0.3mg daily , continue with hold parameters   - will monitor cmp am  - DASH/TLC  - monitor BP & titrate BP meds PRN. - chronic  - at home BP meds, losartan/HCTZ 100mg-25mg daily , clonidine 0.3mg daily , Toprol XL 100mg daily continue with hold parameters   - will monitor cmp am  - DASH/TLC  - monitor BP & titrate BP meds PRN.

## 2022-10-11 NOTE — PROGRESS NOTE ADULT - SUBJECTIVE AND OBJECTIVE BOX
[facility]  INFECTIOUS DISEASES   [address] [place] [address]  Tel: [phone]     Fax: [phone]  ======================================================  MD Wilmer Boston Kaushal, MD Cho, Michelle, MD   ======================================================    N-[id number]  DELIA ELOISA     Follow up: LLE cellulitis     No fever, swelling slightly better. No pain. Mild warmth.     PAST MEDICAL & SURGICAL HISTORY:  Atrial fibrillation, unspecified type  Hypertension  HLD (hyperlipidemia)  Prostate cancer  H/O prostatectomy  Presence of stent in artery    Social Hx: no smoking, ETOH Or drugs     FAMILY HISTORY:  No pertinent family history in first degree relatives    Allergies  No Known Allergies    Antibiotics:  MEDICATIONS  (STANDING):  apixaban 5 milliGRAM(s) Oral every 12 hours  aspirin enteric coated 81 milliGRAM(s) Oral daily  atorvastatin 40 milliGRAM(s) Oral at bedtime  cloNIDine 0.3 milliGRAM(s) Oral at bedtime  gabapentin 100 milliGRAM(s) Oral every 8 hours  hydrochlorothiazide 25 milliGRAM(s) Oral daily  losartan 100 milliGRAM(s) Oral daily  multivitamin 1 Tablet(s) Oral daily  piperacillin/tazobactam IVPB.. 3.375 Gram(s) IV Intermittent every 8 hours  vancomycin  IVPB 1250 milliGRAM(s) IV Intermittent every 12 hours     REVIEW OF SYSTEMS:  CONSTITUTIONAL:  No Fever or chills  HEENT:  No diplopia or blurred vision.  No sore throat or runny nose.  CARDIOVASCULAR:  No chest pain or SOB.  RESPIRATORY:  No cough, shortness of breath, PND or orthopnea.  GASTROINTESTINAL:  No nausea, vomiting or diarrhea.  GENITOURINARY:  No dysuria, frequency or urgency. No Blood in urine  MUSCULOSKELETAL:  leg swelling and ulcers   SKIN:  leg ulcers   NEUROLOGIC:  No paresthesias, fasciculations, seizures or weakness.  PSYCHIATRIC:  No disorder of thought or mood.  ENDOCRINE:  No heat or cold intolerance, polyuria or polydipsia.  HEMATOLOGICAL:  No easy bruising or bleeding.     Physical Exam:  Vital Signs Last 24 Hrs  T(C): 36.3 (11 Oct 2022 11:47), Max: 36.7 (10 Oct 2022 16:33)  T(F): 97.4 (11 Oct 2022 11:47), Max: 98.1 (10 Oct 2022 16:33)  HR: 65 (11 Oct 2022 11:47) (65 - 89)  BP: 162/87 (11 Oct 2022 11:47) (148/87 - 189/89)  BP(mean): --  RR: 18 (11 Oct 2022 11:47) (18 - 18)  SpO2: 95% (11 Oct 2022 11:47) (95% - 100%)  Parameters below as of 11 Oct 2022 11:47  Patient On (Oxygen Delivery Method): room air  GEN: NAD, obese   HEENT: normocephalic and atraumatic. EOMI. PERRL.    NECK: Supple.  No lymphadenopathy   LUNGS: Clear to auscultation.  HEART: Regular rate and rhythm without murmur.  ABDOMEN: Soft, nontender, and nondistended.  Positive bowel sounds.    : No CVA tenderness  EXTREMITIES: Chronic skin changes in both lower legs  Left leg with multiple dry ulcers, below knee leg is swollen with warmth  erythema and tenderness, no purulent discharge or fluctuation.    NEUROLOGIC: grossly intact.  PSYCHIATRIC: Appropriate affect .  SKIN: No rash, legs as above       Labs:                        14.7   8.76  )-----------( 258      ( 11 Oct 2022 05:44 )             41.7     10-11    142  |  104  |  24<H>  ----------------------------<  111<H>  3.6   |  31  |  1.20    Ca    9.7      11 Oct 2022 05:44  Mg     2.3     10-11    TPro  7.9  /  Alb  3.7  /  TBili  1.1  /  DBili  x   /  AST  21  /  ALT  30  /  AlkPhos  78  10-11    Culture - Blood (collected 10-09-22 @ 19:22)  Source: .Blood Blood-Peripheral    Culture - Blood (collected 10-09-22 @ 19:17)  Source: .Blood Blood-Peripheral    WBC Count: 8.76 K/uL (10-11-22 @ 05:44)  WBC Count: 9.17 K/uL (10-10-22 @ 09:37)  WBC Count: 10.52 K/uL (10-09-22 @ 19:17)    Creatinine, Serum: 1.20 mg/dL (10-11-22 @ 05:44)  Creatinine, Serum: 1.30 mg/dL (10-10-22 @ 09:37)  Creatinine, Serum: 1.20 mg/dL (10-09-22 @ 19:17)    C-Reactive Protein, Serum: 6 mg/L (10-09-22 @ 19:17)    Sedimentation Rate, Erythrocyte: 21 mm/hr (10-09-22 @ 19:17)     COVID-19 PCR: NotDetec (10-09-22 @ 23:09)    All imaging and other data have been reviewed.  < from: US Duplex Venous Lower Ext Ltd, Left (10.09.22 @ 20:31) >  IMPRESSION:  No evidence of left lower extremity deep venous thrombosis.    < from: Xray Tibia + Fibula 2 Views, Left (10.09.22 @ 20:40) >  IMPRESSION:   No acute radiographic osseous pathology..    Assessment and Plan:   66yo man with PMH of HTN, CAD s/p CABG, A. fib, prostate cancer s/p prostatectomy, and chronic venous stasis was admitted with leg ulcers and cellulitis.   He had blisters and swelling in legs that opened up causing ulcers that is going on for about 6months. Seen vascular doctor in the past stating that had good arterial circulation.   Feels burning pain in both legs. No fever or chills. Not seen in wound center.      Recommendations:  - Blood cultures NGTD  - MRSA PCR pending   - Vascular consult and wound care consults   - Continue zosyn 3.375gm q8    Will follow.    Lupe Ny MD  Division of Infectious Diseases   Please call ID service at [phone] with any question.      35 minutes spent on total encounter assessing patient, examination, chart review, counseling and coordinating care by the attending physician/nurse/care manager.

## 2022-10-11 NOTE — PATIENT PROFILE ADULT - FALL HARM RISK - HARM RISK INTERVENTIONS

## 2022-10-11 NOTE — PATIENT PROFILE ADULT - FUNCTIONAL ASSESSMENT - BASIC MOBILITY 6.
3-calculated by average/Not able to assess (calculate score using [facility] averaging method)

## 2022-10-11 NOTE — PROGRESS NOTE ADULT - SUBJECTIVE AND OBJECTIVE BOX
Patient is a 67y old  Male who presents with a chief complaint of LLE cellulitis (10 Oct 2022 11:30)      Subjective:  INTERVAL HPI/OVERNIGHT EVENTS: Patient seen and examined at bedside. No overnight events occurred. Patient has no complaints at this time. Denies fevers, chills, headache, lightheadedness, chest pain, dyspnea, abdominal pain, n/v/d/c.    MEDICATIONS  (STANDING):  apixaban 5 milliGRAM(s) Oral every 12 hours  aspirin enteric coated 81 milliGRAM(s) Oral daily  atorvastatin 40 milliGRAM(s) Oral at bedtime  cloNIDine 0.3 milliGRAM(s) Oral at bedtime  gabapentin 100 milliGRAM(s) Oral every 8 hours  hydrochlorothiazide 25 milliGRAM(s) Oral daily  losartan 100 milliGRAM(s) Oral daily  metoprolol succinate  milliGRAM(s) Oral daily  multivitamin 1 Tablet(s) Oral daily  piperacillin/tazobactam IVPB.. 3.375 Gram(s) IV Intermittent every 8 hours    MEDICATIONS  (PRN):  acetaminophen     Tablet .. 650 milliGRAM(s) Oral every 6 hours PRN Temp greater or equal to 38C (100.4F), Mild Pain (1 - 3)  aluminum hydroxide/magnesium hydroxide/simethicone Suspension 30 milliLiter(s) Oral every 4 hours PRN Dyspepsia  melatonin 3 milliGRAM(s) Oral at bedtime PRN Insomnia  ondansetron Injectable 4 milliGRAM(s) IV Push every 8 hours PRN Nausea and/or Vomiting      Allergies    No Known Allergies    Intolerances        REVIEW OF SYSTEMS:  CONSTITUTIONAL: No fever or chills  HEENT:  No headache, no sore throat  RESPIRATORY: No cough, wheezing, or shortness of breath  CARDIOVASCULAR: No chest pain, palpitations  GASTROINTESTINAL: No abd pain, nausea, vomiting, or diarrhea  GENITOURINARY: No dysuria, frequency, or hematuria  NEUROLOGICAL: no focal weakness or dizziness  MUSCULOSKELETAL: no myalgias     Objective:  Vital Signs Last 24 Hrs  T(C): 36.6 (11 Oct 2022 05:49), Max: 36.8 (10 Oct 2022 12:04)  T(F): 97.8 (11 Oct 2022 05:49), Max: 98.3 (10 Oct 2022 12:04)  HR: 89 (11 Oct 2022 05:49) (65 - 89)  BP: 148/87 (11 Oct 2022 05:49) (148/87 - 189/89)  BP(mean): --  RR: 18 (11 Oct 2022 05:49) (17 - 18)  SpO2: 96% (11 Oct 2022 05:49) (96% - 97%)    Parameters below as of 11 Oct 2022 05:49  Patient On (Oxygen Delivery Method): room air        GENERAL: NAD, lying in bed comfortably  HEAD:  Atraumatic, Normocephalic  EYES: EOMI, PERRLA, conjunctiva and sclera clear  ENT: Moist mucous membranes  NECK: Supple, No JVD  CHEST/LUNG: Clear to auscultation bilaterally; No rales, rhonchi, wheezing, or rubs. Unlabored respirations  HEART: Regular rate and rhythm; No murmurs, rubs, or gallops  ABDOMEN: Bowel sounds present; Soft, Nontender, Nondistended. No hepatomegaly  EXTREMITIES:  2+ Peripheral Pulses, brisk capillary refill. No clubbing, cyanosis, or edema  NERVOUS SYSTEM:  Alert & Oriented X3, speech clear. No deficits   MSK: FROM all 4 extremities, full and equal strength  SKIN: No rashes or lesions    LABS:                        14.7   8.76  )-----------( 258      ( 11 Oct 2022 05:44 )             41.7     CBC Full  -  ( 11 Oct 2022 05:44 )  WBC Count : 8.76 K/uL  Hemoglobin : 14.7 g/dL  Hematocrit : 41.7 %  Platelet Count - Automated : 258 K/uL  Mean Cell Volume : 90.3 fl  Mean Cell Hemoglobin : 31.8 pg  Mean Cell Hemoglobin Concentration : 35.3 gm/dL  Auto Neutrophil # : 5.25 K/uL  Auto Lymphocyte # : 2.19 K/uL  Auto Monocyte # : 0.89 K/uL  Auto Eosinophil # : 0.36 K/uL  Auto Basophil # : 0.04 K/uL  Auto Neutrophil % : 59.9 %  Auto Lymphocyte % : 25.0 %  Auto Monocyte % : 10.2 %  Auto Eosinophil % : 4.1 %  Auto Basophil % : 0.5 %    11 Oct 2022 05:44    142    |  104    |  24     ----------------------------<  111    3.6     |  31     |  1.20     Ca    9.7        11 Oct 2022 05:44  Mg     2.3       11 Oct 2022 05:44    TPro  7.9    /  Alb  3.7    /  TBili  1.1    /  DBili  x      /  AST  21     /  ALT  30     /  AlkPhos  78     11 Oct 2022 05:44        CAPILLARY BLOOD GLUCOSE            Culture - Blood (collected 10-09-22 @ 19:22)  Source: .Blood Blood-Peripheral  Preliminary Report (10-11-22 @ 02:01):    No growth to date.    Culture - Blood (collected 10-09-22 @ 19:17)  Source: .Blood Blood-Peripheral  Preliminary Report (10-11-22 @ 02:01):    No growth to date.        RADIOLOGY & ADDITIONAL TESTS:    Personally reviewed.     Consultant(s) Notes Reviewed:  [x] YES  [ ] NO     Patient is a 67y old  Male who presents with a chief complaint of LLE cellulitis (10 Oct 2022 11:30)      Subjective:  INTERVAL HPI/OVERNIGHT EVENTS: Patient seen and examined at bedside. No overnight events occurred. Pt was sleeping comfortably in the bed. Patient has mild burning pain with numbness and tingling in the left leg at this time. He is ambulating to the bathroom. Denies fevers, chills, headache, lightheadedness, chest pain, dyspnea, abdominal pain, n/v/d/c.    MEDICATIONS  (STANDING):  apixaban 5 milliGRAM(s) Oral every 12 hours  aspirin enteric coated 81 milliGRAM(s) Oral daily  atorvastatin 40 milliGRAM(s) Oral at bedtime  cloNIDine 0.3 milliGRAM(s) Oral at bedtime  gabapentin 100 milliGRAM(s) Oral every 8 hours  hydrochlorothiazide 25 milliGRAM(s) Oral daily  losartan 100 milliGRAM(s) Oral daily  metoprolol succinate  milliGRAM(s) Oral daily  multivitamin 1 Tablet(s) Oral daily  piperacillin/tazobactam IVPB.. 3.375 Gram(s) IV Intermittent every 8 hours    MEDICATIONS  (PRN):  acetaminophen     Tablet .. 650 milliGRAM(s) Oral every 6 hours PRN Temp greater or equal to 38C (100.4F), Mild Pain (1 - 3)  aluminum hydroxide/magnesium hydroxide/simethicone Suspension 30 milliLiter(s) Oral every 4 hours PRN Dyspepsia  melatonin 3 milliGRAM(s) Oral at bedtime PRN Insomnia  ondansetron Injectable 4 milliGRAM(s) IV Push every 8 hours PRN Nausea and/or Vomiting      Allergies    No Known Allergies    Intolerances        REVIEW OF SYSTEMS:  CONSTITUTIONAL: No fever or chills  HEENT:  No headache, no sore throat  RESPIRATORY: No cough, wheezing, or shortness of breath  CARDIOVASCULAR: No chest pain, palpitations  GASTROINTESTINAL: No abd pain, nausea, vomiting, or diarrhea  GENITOURINARY: No dysuria, frequency, or hematuria  NEUROLOGICAL: no focal weakness or dizziness  MUSCULOSKELETAL: mild burning pain in the left leg.     Objective:  Vital Signs Last 24 Hrs  T(C): 36.6 (11 Oct 2022 05:49), Max: 36.8 (10 Oct 2022 12:04)  T(F): 97.8 (11 Oct 2022 05:49), Max: 98.3 (10 Oct 2022 12:04)  HR: 89 (11 Oct 2022 05:49) (65 - 89)  BP: 148/87 (11 Oct 2022 05:49) (148/87 - 189/89)  BP(mean): --  RR: 18 (11 Oct 2022 05:49) (17 - 18)  SpO2: 96% (11 Oct 2022 05:49) (96% - 97%)    Parameters below as of 11 Oct 2022 05:49  Patient On (Oxygen Delivery Method): room air        GENERAL: NAD, lying in bed comfortably  HEAD:  Atraumatic, Normocephalic  EYES: EOMI, PERRLA, conjunctiva and sclera clear  ENT: Moist mucous membranes  NECK: Supple, No JVD  CHEST/LUNG: Clear to auscultation bilaterally; No rales, rhonchi, wheezing, or rubs. Unlabored respirations  HEART: Regular rate and rhythm; No murmurs, rubs, or gallops  ABDOMEN: Bowel sounds present; Soft, Nontender, Nondistended. No hepatomegaly  EXTREMITIES: Signs of chronic venous stasis in LE bilaterally with dry, dark and shiny skins with no hair follicles, scrapes seen in the anterior part of left leg with mild swelling.   2+ Peripheral Pulses, brisk capillary refill. No clubbing, cyanosis, or edema  NERVOUS SYSTEM:  Alert & Oriented X3, speech clear. No deficits   MSK: FROM all 4 extremities, full and equal strength  SKIN: No rashes or lesions    LABS:                        14.7   8.76  )-----------( 258      ( 11 Oct 2022 05:44 )             41.7     CBC Full  -  ( 11 Oct 2022 05:44 )  WBC Count : 8.76 K/uL  Hemoglobin : 14.7 g/dL  Hematocrit : 41.7 %  Platelet Count - Automated : 258 K/uL  Mean Cell Volume : 90.3 fl  Mean Cell Hemoglobin : 31.8 pg  Mean Cell Hemoglobin Concentration : 35.3 gm/dL  Auto Neutrophil # : 5.25 K/uL  Auto Lymphocyte # : 2.19 K/uL  Auto Monocyte # : 0.89 K/uL  Auto Eosinophil # : 0.36 K/uL  Auto Basophil # : 0.04 K/uL  Auto Neutrophil % : 59.9 %  Auto Lymphocyte % : 25.0 %  Auto Monocyte % : 10.2 %  Auto Eosinophil % : 4.1 %  Auto Basophil % : 0.5 %    11 Oct 2022 05:44    142    |  104    |  24     ----------------------------<  111    3.6     |  31     |  1.20     Ca    9.7        11 Oct 2022 05:44  Mg     2.3       11 Oct 2022 05:44    TPro  7.9    /  Alb  3.7    /  TBili  1.1    /  DBili  x      /  AST  21     /  ALT  30     /  AlkPhos  78     11 Oct 2022 05:44        CAPILLARY BLOOD GLUCOSE            Culture - Blood (collected 10-09-22 @ 19:22)  Source: .Blood Blood-Peripheral  Preliminary Report (10-11-22 @ 02:01):    No growth to date.    Culture - Blood (collected 10-09-22 @ 19:17)  Source: .Blood Blood-Peripheral  Preliminary Report (10-11-22 @ 02:01):    No growth to date.        RADIOLOGY & ADDITIONAL TESTS:    Personally reviewed.     Consultant(s) Notes Reviewed:  [x] YES  [ ] NO

## 2022-10-12 DIAGNOSIS — R19.7 DIARRHEA, UNSPECIFIED: ICD-10-CM

## 2022-10-12 LAB
ANION GAP SERPL CALC-SCNC: 9 MMOL/L — SIGNIFICANT CHANGE UP (ref 5–17)
BASOPHILS # BLD AUTO: 0.04 K/UL — SIGNIFICANT CHANGE UP (ref 0–0.2)
BASOPHILS NFR BLD AUTO: 0.4 % — SIGNIFICANT CHANGE UP (ref 0–2)
BUN SERPL-MCNC: 27 MG/DL — HIGH (ref 7–23)
CALCIUM SERPL-MCNC: 9.5 MG/DL — SIGNIFICANT CHANGE UP (ref 8.5–10.1)
CHLORIDE SERPL-SCNC: 106 MMOL/L — SIGNIFICANT CHANGE UP (ref 96–108)
CO2 SERPL-SCNC: 26 MMOL/L — SIGNIFICANT CHANGE UP (ref 22–31)
CREAT SERPL-MCNC: 1.2 MG/DL — SIGNIFICANT CHANGE UP (ref 0.5–1.3)
EGFR: 66 ML/MIN/1.73M2 — SIGNIFICANT CHANGE UP
EOSINOPHIL # BLD AUTO: 0.33 K/UL — SIGNIFICANT CHANGE UP (ref 0–0.5)
EOSINOPHIL NFR BLD AUTO: 3.4 % — SIGNIFICANT CHANGE UP (ref 0–6)
GLUCOSE SERPL-MCNC: 109 MG/DL — HIGH (ref 70–99)
HCT VFR BLD CALC: 39.5 % — SIGNIFICANT CHANGE UP (ref 39–50)
HGB BLD-MCNC: 14 G/DL — SIGNIFICANT CHANGE UP (ref 13–17)
IMM GRANULOCYTES NFR BLD AUTO: 0.4 % — SIGNIFICANT CHANGE UP (ref 0–0.9)
LYMPHOCYTES # BLD AUTO: 1.97 K/UL — SIGNIFICANT CHANGE UP (ref 1–3.3)
LYMPHOCYTES # BLD AUTO: 20.6 % — SIGNIFICANT CHANGE UP (ref 13–44)
MCHC RBC-ENTMCNC: 31.7 PG — SIGNIFICANT CHANGE UP (ref 27–34)
MCHC RBC-ENTMCNC: 35.4 GM/DL — SIGNIFICANT CHANGE UP (ref 32–36)
MCV RBC AUTO: 89.6 FL — SIGNIFICANT CHANGE UP (ref 80–100)
MONOCYTES # BLD AUTO: 1.03 K/UL — HIGH (ref 0–0.9)
MONOCYTES NFR BLD AUTO: 10.8 % — SIGNIFICANT CHANGE UP (ref 2–14)
NEUTROPHILS # BLD AUTO: 6.17 K/UL — SIGNIFICANT CHANGE UP (ref 1.8–7.4)
NEUTROPHILS NFR BLD AUTO: 64.4 % — SIGNIFICANT CHANGE UP (ref 43–77)
NRBC # BLD: 0 /100 WBCS — SIGNIFICANT CHANGE UP (ref 0–0)
PLATELET # BLD AUTO: 238 K/UL — SIGNIFICANT CHANGE UP (ref 150–400)
POTASSIUM SERPL-MCNC: 3.3 MMOL/L — LOW (ref 3.5–5.3)
POTASSIUM SERPL-SCNC: 3.3 MMOL/L — LOW (ref 3.5–5.3)
RBC # BLD: 4.41 M/UL — SIGNIFICANT CHANGE UP (ref 4.2–5.8)
RBC # FLD: 13.5 % — SIGNIFICANT CHANGE UP (ref 10.3–14.5)
SODIUM SERPL-SCNC: 141 MMOL/L — SIGNIFICANT CHANGE UP (ref 135–145)
WBC # BLD: 9.58 K/UL — SIGNIFICANT CHANGE UP (ref 3.8–10.5)
WBC # FLD AUTO: 9.58 K/UL — SIGNIFICANT CHANGE UP (ref 3.8–10.5)

## 2022-10-12 PROCEDURE — 99233 SBSQ HOSP IP/OBS HIGH 50: CPT

## 2022-10-12 PROCEDURE — 99233 SBSQ HOSP IP/OBS HIGH 50: CPT | Mod: GC

## 2022-10-12 PROCEDURE — 70551 MRI BRAIN STEM W/O DYE: CPT | Mod: 26

## 2022-10-12 PROCEDURE — 70544 MR ANGIOGRAPHY HEAD W/O DYE: CPT | Mod: 26,59

## 2022-10-12 RX ORDER — SACCHAROMYCES BOULARDII 250 MG
250 POWDER IN PACKET (EA) ORAL
Refills: 0 | Status: DISCONTINUED | OUTPATIENT
Start: 2022-10-12 | End: 2022-10-15

## 2022-10-12 RX ORDER — POTASSIUM CHLORIDE 20 MEQ
40 PACKET (EA) ORAL EVERY 4 HOURS
Refills: 0 | Status: COMPLETED | OUTPATIENT
Start: 2022-10-12 | End: 2022-10-12

## 2022-10-12 RX ADMIN — APIXABAN 5 MILLIGRAM(S): 2.5 TABLET, FILM COATED ORAL at 06:17

## 2022-10-12 RX ADMIN — PIPERACILLIN AND TAZOBACTAM 25 GRAM(S): 4; .5 INJECTION, POWDER, LYOPHILIZED, FOR SOLUTION INTRAVENOUS at 13:14

## 2022-10-12 RX ADMIN — GABAPENTIN 100 MILLIGRAM(S): 400 CAPSULE ORAL at 21:09

## 2022-10-12 RX ADMIN — Medication 0.3 MILLIGRAM(S): at 21:09

## 2022-10-12 RX ADMIN — PIPERACILLIN AND TAZOBACTAM 25 GRAM(S): 4; .5 INJECTION, POWDER, LYOPHILIZED, FOR SOLUTION INTRAVENOUS at 06:18

## 2022-10-12 RX ADMIN — ATORVASTATIN CALCIUM 40 MILLIGRAM(S): 80 TABLET, FILM COATED ORAL at 21:09

## 2022-10-12 RX ADMIN — GABAPENTIN 100 MILLIGRAM(S): 400 CAPSULE ORAL at 13:14

## 2022-10-12 RX ADMIN — GABAPENTIN 100 MILLIGRAM(S): 400 CAPSULE ORAL at 06:17

## 2022-10-12 RX ADMIN — Medication 650 MILLIGRAM(S): at 13:15

## 2022-10-12 RX ADMIN — Medication 40 MILLIEQUIVALENT(S): at 13:14

## 2022-10-12 RX ADMIN — APIXABAN 5 MILLIGRAM(S): 2.5 TABLET, FILM COATED ORAL at 17:09

## 2022-10-12 RX ADMIN — PIPERACILLIN AND TAZOBACTAM 25 GRAM(S): 4; .5 INJECTION, POWDER, LYOPHILIZED, FOR SOLUTION INTRAVENOUS at 21:09

## 2022-10-12 RX ADMIN — Medication 81 MILLIGRAM(S): at 13:14

## 2022-10-12 RX ADMIN — Medication 250 MILLIGRAM(S): at 17:09

## 2022-10-12 RX ADMIN — Medication 25 MILLIGRAM(S): at 06:18

## 2022-10-12 RX ADMIN — Medication 650 MILLIGRAM(S): at 14:15

## 2022-10-12 RX ADMIN — Medication 1 TABLET(S): at 13:15

## 2022-10-12 RX ADMIN — Medication 100 MILLIGRAM(S): at 06:16

## 2022-10-12 RX ADMIN — LOSARTAN POTASSIUM 100 MILLIGRAM(S): 100 TABLET, FILM COATED ORAL at 06:17

## 2022-10-12 RX ADMIN — Medication 40 MILLIEQUIVALENT(S): at 17:09

## 2022-10-12 NOTE — PROGRESS NOTE ADULT - PROBLEM SELECTOR PLAN 1
- Hx of chronic venous stasis ,here with left lower leg wounds, likely 2/2 LLE cellulitis  - Leucocytosis 10k downtrending to 9.58(10/12/22), afebrile , doesn't meet SIRS criteria    - BCx shows no growth to date  - MRSA, MSSA PCR negative  - Continue Zosyn 3.375 gm Q8h.   - Tylenol 650 mg PO q6h PRN for mild pain or fever  - Monitor WBC and fever curve  - ID Dr. Ny consulted, recs tab  - Wound care consulted, recs  appreciated  - PT consulted, recs appreciated  - pod consulted, recs appreciated - Hx of chronic venous stasis ,here with left lower leg wounds, likely 2/2 LLE cellulitis  - Leucocytosis 10k downtrending to 9.58(10/12/22), afebrile , doesn't meet SIRS criteria    - BCx shows no growth to date  - MRSA, MSSA PCR negative  - Continue Zosyn 3.375 gm Q8h.   - ID Dr. Ny consulted, recs tab  - Wound care consulted, recs  appreciated  - PT consulted, recs appreciated  - pod consulted, recs appreciated - Hx of chronic venous stasis, admitted with left lower leg wounds and LLE cellulitis  - Leucocytosis 10k downtrending to 9.58(10/12/22), afebrile, doesn't meet SIRS criteria    - BCx shows no growth to date  - MRSA, MSSA PCR negative  - reportedly had some yellow/greenish discharge before admission that resolved with Abx (no wound Cx available and currently not having discharge)  - unclear whether that discharge may have been a sign of Pseudomonas infection  - Continue Zosyn 3.375g IV Q8h for now and transition to oral Abx when deemed appropriate by ID  - cellulitis gradually improving  - encouraged pt to elevate LE  - ID Dr. Ny consulted, recs appreciated  - Wound care (Abdiel) consulted, recs appreciated  - PT consulted, recs appreciated

## 2022-10-12 NOTE — PROGRESS NOTE ADULT - PROBLEM SELECTOR PLAN 2
- K trended down from 3.6--->3.3. probably 2/2 diarrhea Vs HCTZ.  - will give KCL 40 mg for two doses.   - f/u cmp, Mg - K trended down from 3.6--->3.3. probably 2/2 loose stool and HCTZ  - will give KCL 40meq for two doses.   - f/u cmp, Mg

## 2022-10-12 NOTE — PROGRESS NOTE ADULT - PROBLEM SELECTOR PLAN 3
- Pt had two episodes of non bloody, soft diarrhea with no mucous, yesterday.  - Probably 2/2 abx   - Will start probiotics  - F/u on electrolytes, oral hydration. - Pt had two episodes of non bloody, soft diarrhea with no mucous, yesterday.  - Probably 2/2 abx   - Will start probiotic  - F/u on electrolytes, oral hydration. - Pt had two episodes of non bloody, soft diarrhea with no mucous, yesterday.  - Probably 2/2 abx   - started probiotics  - F/u on electrolytes, oral hydration.

## 2022-10-12 NOTE — PROGRESS NOTE ADULT - SUBJECTIVE AND OBJECTIVE BOX
Patient is a 67y old  Male who presents with a chief complaint of LLE cellulitis (11 Oct 2022 14:28)      Subjective:  INTERVAL HPI/OVERNIGHT EVENTS: Patient seen and examined at bedside. No overnight events occurred. Patient has no complaints at this time. Denies fevers, chills, headache, lightheadedness, chest pain, dyspnea, abdominal pain, n/v/d/c.    MEDICATIONS  (STANDING):  apixaban 5 milliGRAM(s) Oral every 12 hours  aspirin enteric coated 81 milliGRAM(s) Oral daily  atorvastatin 40 milliGRAM(s) Oral at bedtime  cloNIDine 0.3 milliGRAM(s) Oral at bedtime  gabapentin 100 milliGRAM(s) Oral every 8 hours  hydrochlorothiazide 25 milliGRAM(s) Oral daily  influenza  Vaccine (HIGH DOSE) 0.7 milliLiter(s) IntraMuscular once  losartan 100 milliGRAM(s) Oral daily  metoprolol succinate  milliGRAM(s) Oral daily  multivitamin 1 Tablet(s) Oral daily  piperacillin/tazobactam IVPB.. 3.375 Gram(s) IV Intermittent every 8 hours  potassium chloride    Tablet ER 40 milliEquivalent(s) Oral every 4 hours  saccharomyces boulardii 250 milliGRAM(s) Oral two times a day    MEDICATIONS  (PRN):  acetaminophen     Tablet .. 650 milliGRAM(s) Oral every 6 hours PRN Temp greater or equal to 38C (100.4F), Mild Pain (1 - 3)  aluminum hydroxide/magnesium hydroxide/simethicone Suspension 30 milliLiter(s) Oral every 4 hours PRN Dyspepsia  melatonin 3 milliGRAM(s) Oral at bedtime PRN Insomnia  ondansetron Injectable 4 milliGRAM(s) IV Push every 8 hours PRN Nausea and/or Vomiting      Allergies    No Known Allergies    Intolerances        REVIEW OF SYSTEMS:  CONSTITUTIONAL: No fever or chills  HEENT:  No headache, no sore throat  RESPIRATORY: No cough, wheezing, or shortness of breath  CARDIOVASCULAR: No chest pain, palpitations  GASTROINTESTINAL: No abd pain, nausea, vomiting, or diarrhea  GENITOURINARY: No dysuria, frequency, or hematuria  NEUROLOGICAL: no focal weakness or dizziness  MUSCULOSKELETAL: no myalgias     Objective:  Vital Signs Last 24 Hrs  T(C): 36.8 (12 Oct 2022 05:08), Max: 36.8 (12 Oct 2022 05:08)  T(F): 98.2 (12 Oct 2022 05:08), Max: 98.2 (12 Oct 2022 05:08)  HR: 82 (12 Oct 2022 06:13) (65 - 82)  BP: 152/100 (12 Oct 2022 06:13) (133/91 - 162/87)  BP(mean): --  RR: 17 (12 Oct 2022 05:08) (17 - 18)  SpO2: 96% (12 Oct 2022 05:08) (95% - 96%)    Parameters below as of 12 Oct 2022 05:08  Patient On (Oxygen Delivery Method): room air        GENERAL: NAD, lying in bed comfortably  HEAD:  Atraumatic, Normocephalic  EYES: EOMI, PERRLA, conjunctiva and sclera clear  ENT: Moist mucous membranes  NECK: Supple, No JVD  CHEST/LUNG: Clear to auscultation bilaterally; No rales, rhonchi, wheezing, or rubs. Unlabored respirations  HEART: Regular rate and rhythm; No murmurs, rubs, or gallops  ABDOMEN: Bowel sounds present; Soft, Nontender, Nondistended. No hepatomegaly  EXTREMITIES:  2+ Peripheral Pulses, brisk capillary refill. No clubbing, cyanosis, or edema  NERVOUS SYSTEM:  Alert & Oriented X3, speech clear. No deficits   MSK: FROM all 4 extremities, full and equal strength  SKIN: No rashes or lesions    LABS:                        14.0   9.58  )-----------( 238      ( 12 Oct 2022 08:20 )             39.5     CBC Full  -  ( 12 Oct 2022 08:20 )  WBC Count : 9.58 K/uL  Hemoglobin : 14.0 g/dL  Hematocrit : 39.5 %  Platelet Count - Automated : 238 K/uL  Mean Cell Volume : 89.6 fl  Mean Cell Hemoglobin : 31.7 pg  Mean Cell Hemoglobin Concentration : 35.4 gm/dL  Auto Neutrophil # : 6.17 K/uL  Auto Lymphocyte # : 1.97 K/uL  Auto Monocyte # : 1.03 K/uL  Auto Eosinophil # : 0.33 K/uL  Auto Basophil # : 0.04 K/uL  Auto Neutrophil % : 64.4 %  Auto Lymphocyte % : 20.6 %  Auto Monocyte % : 10.8 %  Auto Eosinophil % : 3.4 %  Auto Basophil % : 0.4 %    12 Oct 2022 08:20    141    |  106    |  27     ----------------------------<  109    3.3     |  26     |  1.20     Ca    9.5        12 Oct 2022 08:20          CAPILLARY BLOOD GLUCOSE            Culture - Blood (collected 10-09-22 @ 19:22)  Source: .Blood Blood-Peripheral  Preliminary Report (10-11-22 @ 02:01):    No growth to date.    Culture - Blood (collected 10-09-22 @ 19:17)  Source: .Blood Blood-Peripheral  Preliminary Report (10-11-22 @ 02:01):    No growth to date.        RADIOLOGY & ADDITIONAL TESTS:    Personally reviewed.     Consultant(s) Notes Reviewed:  [x] YES  [ ] NO     Patient is a 67y old  Male who presents with a chief complaint of LLE cellulitis (11 Oct 2022 14:28)      Subjective:  INTERVAL HPI/OVERNIGHT EVENTS: Patient seen and examined at bedside. No overnight events occurred. Patient was sleeping comfortably in the bed, alert and oriented, reports sleeping well, tolerating diet,. He complains of mild burning sensation in the left leg but reports that it is better than before associated with a felling of soft floor when walking. He mentioned that the wond is dry now and there is no discharge as before. He mentioned having two episodes of soft diarrhea yesterday without blood or mucous. Denies fevers, chills, headache, lightheadedness, chest pain, dyspnea, abdominal pain, n/v/c.    MEDICATIONS  (STANDING):  apixaban 5 milliGRAM(s) Oral every 12 hours  aspirin enteric coated 81 milliGRAM(s) Oral daily  atorvastatin 40 milliGRAM(s) Oral at bedtime  cloNIDine 0.3 milliGRAM(s) Oral at bedtime  gabapentin 100 milliGRAM(s) Oral every 8 hours  hydrochlorothiazide 25 milliGRAM(s) Oral daily  influenza  Vaccine (HIGH DOSE) 0.7 milliLiter(s) IntraMuscular once  losartan 100 milliGRAM(s) Oral daily  metoprolol succinate  milliGRAM(s) Oral daily  multivitamin 1 Tablet(s) Oral daily  piperacillin/tazobactam IVPB.. 3.375 Gram(s) IV Intermittent every 8 hours  potassium chloride    Tablet ER 40 milliEquivalent(s) Oral every 4 hours  saccharomyces boulardii 250 milliGRAM(s) Oral two times a day    MEDICATIONS  (PRN):  acetaminophen     Tablet .. 650 milliGRAM(s) Oral every 6 hours PRN Temp greater or equal to 38C (100.4F), Mild Pain (1 - 3)  aluminum hydroxide/magnesium hydroxide/simethicone Suspension 30 milliLiter(s) Oral every 4 hours PRN Dyspepsia  melatonin 3 milliGRAM(s) Oral at bedtime PRN Insomnia  ondansetron Injectable 4 milliGRAM(s) IV Push every 8 hours PRN Nausea and/or Vomiting      Allergies    No Known Allergies    Intolerances        REVIEW OF SYSTEMS:  CONSTITUTIONAL: No fever or chills  HEENT:  No headache, no sore throat  RESPIRATORY: No cough, wheezing, or shortness of breath  CARDIOVASCULAR: No chest pain, palpitations  GASTROINTESTINAL: No abd pain, nausea, vomiting, or diarrhea  GENITOURINARY: No dysuria, frequency, or hematuria  NEUROLOGICAL: no focal weakness or dizziness  MUSCULOSKELETAL: no myalgias     Objective:  Vital Signs Last 24 Hrs  T(C): 36.8 (12 Oct 2022 05:08), Max: 36.8 (12 Oct 2022 05:08)  T(F): 98.2 (12 Oct 2022 05:08), Max: 98.2 (12 Oct 2022 05:08)  HR: 82 (12 Oct 2022 06:13) (65 - 82)  BP: 152/100 (12 Oct 2022 06:13) (133/91 - 162/87)  BP(mean): --  RR: 17 (12 Oct 2022 05:08) (17 - 18)  SpO2: 96% (12 Oct 2022 05:08) (95% - 96%)    Parameters below as of 12 Oct 2022 05:08  Patient On (Oxygen Delivery Method): room air        GENERAL: NAD, lying in bed comfortably  HEAD:  Atraumatic, Normocephalic  EYES: EOMI, PERRLA, conjunctiva and sclera clear  ENT: Moist mucous membranes  NECK: Supple, No JVD  CHEST/LUNG: Clear to auscultation bilaterally; No rales, rhonchi, wheezing, or rubs. Unlabored respirations  HEART: Irrigular rhythm; No murmurs, rubs, or gallops  ABDOMEN: Bowel sounds present; Soft, Nontender, Nondistended. No hepatomegaly  EXTREMITIES: Shows signs of chronic venous stasis, red dry shiny skin with no hair. Left leg is covered by a dressing which is D/C/I.  2+ Peripheral Pulses, brisk capillary refill. No clubbing, cyanosis, or edema  NERVOUS SYSTEM:  Alert & Oriented X3, speech clear. No deficits   MSK: FROM all 4 extremities, full and equal strength  SKIN: No rashes or lesions    LABS:                        14.0   9.58  )-----------( 238      ( 12 Oct 2022 08:20 )             39.5     CBC Full  -  ( 12 Oct 2022 08:20 )  WBC Count : 9.58 K/uL  Hemoglobin : 14.0 g/dL  Hematocrit : 39.5 %  Platelet Count - Automated : 238 K/uL  Mean Cell Volume : 89.6 fl  Mean Cell Hemoglobin : 31.7 pg  Mean Cell Hemoglobin Concentration : 35.4 gm/dL  Auto Neutrophil # : 6.17 K/uL  Auto Lymphocyte # : 1.97 K/uL  Auto Monocyte # : 1.03 K/uL  Auto Eosinophil # : 0.33 K/uL  Auto Basophil # : 0.04 K/uL  Auto Neutrophil % : 64.4 %  Auto Lymphocyte % : 20.6 %  Auto Monocyte % : 10.8 %  Auto Eosinophil % : 3.4 %  Auto Basophil % : 0.4 %    12 Oct 2022 08:20    141    |  106    |  27     ----------------------------<  109    3.3     |  26     |  1.20     Ca    9.5        12 Oct 2022 08:20          CAPILLARY BLOOD GLUCOSE            Culture - Blood (collected 10-09-22 @ 19:22)  Source: .Blood Blood-Peripheral  Preliminary Report (10-11-22 @ 02:01):    No growth to date.    Culture - Blood (collected 10-09-22 @ 19:17)  Source: .Blood Blood-Peripheral  Preliminary Report (10-11-22 @ 02:01):    No growth to date.        RADIOLOGY & ADDITIONAL TESTS:    Personally reviewed.     Consultant(s) Notes Reviewed:  [x] YES  [ ] NO     Patient is a 67y old  Male who presents with a chief complaint of LLE cellulitis (11 Oct 2022 14:28)      Subjective:  INTERVAL HPI/OVERNIGHT EVENTS: Patient seen and examined at bedside. No overnight events occurred. Patient was sleeping comfortably in the bed, alert and oriented, reports sleeping well, tolerating diet,. He complains of mild burning sensation in the left leg but reports that it is better than before associated with a felling of soft floor when walking. He mentioned that the wond is dry now and there is no discharge as before. He mentioned having two episodes of soft diarrhea yesterday without blood or mucous. Denies fevers, chills, headache, lightheadedness, chest pain, dyspnea, abdominal pain, n/v/c.    MEDICATIONS  (STANDING):  apixaban 5 milliGRAM(s) Oral every 12 hours  aspirin enteric coated 81 milliGRAM(s) Oral daily  atorvastatin 40 milliGRAM(s) Oral at bedtime  cloNIDine 0.3 milliGRAM(s) Oral at bedtime  gabapentin 100 milliGRAM(s) Oral every 8 hours  hydrochlorothiazide 25 milliGRAM(s) Oral daily  influenza  Vaccine (HIGH DOSE) 0.7 milliLiter(s) IntraMuscular once  losartan 100 milliGRAM(s) Oral daily  metoprolol succinate  milliGRAM(s) Oral daily  multivitamin 1 Tablet(s) Oral daily  piperacillin/tazobactam IVPB.. 3.375 Gram(s) IV Intermittent every 8 hours  potassium chloride    Tablet ER 40 milliEquivalent(s) Oral every 4 hours  saccharomyces boulardii 250 milliGRAM(s) Oral two times a day    MEDICATIONS  (PRN):  acetaminophen     Tablet .. 650 milliGRAM(s) Oral every 6 hours PRN Temp greater or equal to 38C (100.4F), Mild Pain (1 - 3)  aluminum hydroxide/magnesium hydroxide/simethicone Suspension 30 milliLiter(s) Oral every 4 hours PRN Dyspepsia  melatonin 3 milliGRAM(s) Oral at bedtime PRN Insomnia  ondansetron Injectable 4 milliGRAM(s) IV Push every 8 hours PRN Nausea and/or Vomiting      Allergies    No Known Allergies    Intolerances        REVIEW OF SYSTEMS:  CONSTITUTIONAL: No fever or chills  HEENT:  No headache, no sore throat  RESPIRATORY: No cough, wheezing, or shortness of breath  CARDIOVASCULAR: No chest pain, palpitations  GASTROINTESTINAL: No abd pain, nausea, vomiting, +diarrhea  GENITOURINARY: No dysuria, frequency, or hematuria  NEUROLOGICAL: no focal weakness or dizziness. _+ some mild loss of balance, chronic. + neuropathic pain in legs, chronic   MUSCULOSKELETAL: no myalgias     Objective:  Vital Signs Last 24 Hrs  T(C): 36.8 (12 Oct 2022 05:08), Max: 36.8 (12 Oct 2022 05:08)  T(F): 98.2 (12 Oct 2022 05:08), Max: 98.2 (12 Oct 2022 05:08)  HR: 82 (12 Oct 2022 06:13) (65 - 82)  BP: 152/100 (12 Oct 2022 06:13) (133/91 - 162/87)  BP(mean): --  RR: 17 (12 Oct 2022 05:08) (17 - 18)  SpO2: 96% (12 Oct 2022 05:08) (95% - 96%)    Parameters below as of 12 Oct 2022 05:08  Patient On (Oxygen Delivery Method): room air        GENERAL: NAD, lying in bed comfortably  HEAD:  Atraumatic, Normocephalic  EYES: EOMI, PERRLA, conjunctiva and sclera clear  ENT: Moist mucous membranes  NECK: Supple, No JVD  CHEST/LUNG: Clear to auscultation bilaterally; No rales, rhonchi, wheezing, or rubs. Unlabored respirations  HEART: Irrigular rhythm; No murmurs, rubs, or gallops  ABDOMEN: Bowel sounds present; Soft, Nontender, Nondistended. No hepatomegaly  EXTREMITIES: Shows signs of chronic venous stasis, red dry shiny skin with no hair. Left leg is covered by a dressing which is D/C/I.  2+ Peripheral Pulses, brisk capillary refill. No clubbing, cyanosis, or edema  NERVOUS SYSTEM:  Alert & Oriented X3, speech clear. No deficits   MSK: FROM all 4 extremities, full and equal strength  SKIN: No rashes or lesions    LABS:                        14.0   9.58  )-----------( 238      ( 12 Oct 2022 08:20 )             39.5     CBC Full  -  ( 12 Oct 2022 08:20 )  WBC Count : 9.58 K/uL  Hemoglobin : 14.0 g/dL  Hematocrit : 39.5 %  Platelet Count - Automated : 238 K/uL  Mean Cell Volume : 89.6 fl  Mean Cell Hemoglobin : 31.7 pg  Mean Cell Hemoglobin Concentration : 35.4 gm/dL  Auto Neutrophil # : 6.17 K/uL  Auto Lymphocyte # : 1.97 K/uL  Auto Monocyte # : 1.03 K/uL  Auto Eosinophil # : 0.33 K/uL  Auto Basophil # : 0.04 K/uL  Auto Neutrophil % : 64.4 %  Auto Lymphocyte % : 20.6 %  Auto Monocyte % : 10.8 %  Auto Eosinophil % : 3.4 %  Auto Basophil % : 0.4 %    12 Oct 2022 08:20    141    |  106    |  27     ----------------------------<  109    3.3     |  26     |  1.20     Ca    9.5        12 Oct 2022 08:20          CAPILLARY BLOOD GLUCOSE            Culture - Blood (collected 10-09-22 @ 19:22)  Source: .Blood Blood-Peripheral  Preliminary Report (10-11-22 @ 02:01):    No growth to date.    Culture - Blood (collected 10-09-22 @ 19:17)  Source: .Blood Blood-Peripheral  Preliminary Report (10-11-22 @ 02:01):    No growth to date.        RADIOLOGY & ADDITIONAL TESTS:    Personally reviewed.     Consultant(s) Notes Reviewed:  [x] YES  [ ] NO     Patient is a 67y old  Male who presents with a chief complaint of LLE pain and swelling.      Subjective:  INTERVAL HPI/OVERNIGHT EVENTS: Patient seen and examined at bedside. No overnight events occurred. Patient was sleeping comfortably in the bed, alert and oriented, reports sleeping well, tolerating diet,. He complains of burning sensation in the left leg, but reports that it is better than before. He mentioned that the wound is dry now and there is no discharge as before. He mentioned having two episodes of soft loose BMs yesterday without blood or mucous. Denies fevers, chills, headache, lightheadedness, chest pain, dyspnea, abdominal pain, n/v/c.    MEDICATIONS  (STANDING):  apixaban 5 milliGRAM(s) Oral every 12 hours  aspirin enteric coated 81 milliGRAM(s) Oral daily  atorvastatin 40 milliGRAM(s) Oral at bedtime  cloNIDine 0.3 milliGRAM(s) Oral at bedtime  gabapentin 100 milliGRAM(s) Oral every 8 hours  hydrochlorothiazide 25 milliGRAM(s) Oral daily  influenza  Vaccine (HIGH DOSE) 0.7 milliLiter(s) IntraMuscular once  losartan 100 milliGRAM(s) Oral daily  metoprolol succinate  milliGRAM(s) Oral daily  multivitamin 1 Tablet(s) Oral daily  piperacillin/tazobactam IVPB.. 3.375 Gram(s) IV Intermittent every 8 hours  potassium chloride    Tablet ER 40 milliEquivalent(s) Oral every 4 hours  saccharomyces boulardii 250 milliGRAM(s) Oral two times a day    MEDICATIONS  (PRN):  acetaminophen     Tablet .. 650 milliGRAM(s) Oral every 6 hours PRN Temp greater or equal to 38C (100.4F), Mild Pain (1 - 3)  aluminum hydroxide/magnesium hydroxide/simethicone Suspension 30 milliLiter(s) Oral every 4 hours PRN Dyspepsia  melatonin 3 milliGRAM(s) Oral at bedtime PRN Insomnia  ondansetron Injectable 4 milliGRAM(s) IV Push every 8 hours PRN Nausea and/or Vomiting      Allergies    No Known Allergies    Intolerances        REVIEW OF SYSTEMS:  CONSTITUTIONAL: No fever or chills  HEENT:  No headache, no sore throat  RESPIRATORY: No cough, wheezing, or shortness of breath  CARDIOVASCULAR: No chest pain, palpitations  GASTROINTESTINAL: No abd pain, nausea, vomiting; +loose stools  GENITOURINARY: No dysuria, frequency, or hematuria  NEUROLOGICAL: no focal weakness or dizziness. + some mild loss of balance, chronic. + neuropathic pain in LEs (chronic)   MUSCULOSKELETAL: no myalgias     Objective:  Vital Signs Last 24 Hrs  T(C): 36.8 (12 Oct 2022 05:08), Max: 36.8 (12 Oct 2022 05:08)  T(F): 98.2 (12 Oct 2022 05:08), Max: 98.2 (12 Oct 2022 05:08)  HR: 82 (12 Oct 2022 06:13) (65 - 82)  BP: 152/100 (12 Oct 2022 06:13) (133/91 - 162/87)  BP(mean): --  RR: 17 (12 Oct 2022 05:08) (17 - 18)  SpO2: 96% (12 Oct 2022 05:08) (95% - 96%)    Parameters below as of 12 Oct 2022 05:08  Patient On (Oxygen Delivery Method): room air      PHYSICAL EXAM  GENERAL: NAD, lying in bed comfortably  HEENT: anicteric, moist mucous membranes  CHEST/LUNG: Clear to auscultation bilaterally; No rales, rhonchi, wheezing, or rubs. Unlabored respirations  HEART: Irregular rhythm; S1, S2  ABDOMEN: Bowel sounds present; Soft, Nontender, Nondistended. No hepatomegaly  EXTREMITIES: Shows signs of chronic venous stasis, red dry shiny skin with no hair. Left leg is covered by a dressing which is D/C/I.  2+ Peripheral Pulses, brisk capillary refill. No clubbing, cyanosis, or edema  NERVOUS SYSTEM:  Alert & Oriented X3, speech clear. No deficits     LABS:                        14.0   9.58  )-----------( 238      ( 12 Oct 2022 08:20 )             39.5     CBC Full  -  ( 12 Oct 2022 08:20 )  WBC Count : 9.58 K/uL  Hemoglobin : 14.0 g/dL  Hematocrit : 39.5 %  Platelet Count - Automated : 238 K/uL  Mean Cell Volume : 89.6 fl  Mean Cell Hemoglobin : 31.7 pg  Mean Cell Hemoglobin Concentration : 35.4 gm/dL  Auto Neutrophil # : 6.17 K/uL  Auto Lymphocyte # : 1.97 K/uL  Auto Monocyte # : 1.03 K/uL  Auto Eosinophil # : 0.33 K/uL  Auto Basophil # : 0.04 K/uL  Auto Neutrophil % : 64.4 %  Auto Lymphocyte % : 20.6 %  Auto Monocyte % : 10.8 %  Auto Eosinophil % : 3.4 %  Auto Basophil % : 0.4 %    12 Oct 2022 08:20    141    |  106    |  27     ----------------------------<  109    3.3     |  26     |  1.20     Ca    9.5        12 Oct 2022 08:20          CAPILLARY BLOOD GLUCOSE            Culture - Blood (collected 10-09-22 @ 19:22)  Source: .Blood Blood-Peripheral  Preliminary Report (10-11-22 @ 02:01):    No growth to date.    Culture - Blood (collected 10-09-22 @ 19:17)  Source: .Blood Blood-Peripheral  Preliminary Report (10-11-22 @ 02:01):    No growth to date.        RADIOLOGY & ADDITIONAL TESTS:    Personally reviewed.     Consultant(s) Notes Reviewed:  [x] YES  [ ] NO

## 2022-10-12 NOTE — PROGRESS NOTE ADULT - ASSESSMENT
Patient is 67-year-old male with PMHx of A. fib on Eliquis, hypertension, hyperlipidemia,  prostate cancer s/p prostatectomy, CABG x2 stents, hx of chronic venous stasis  here for left lower leg wounds.  Patient states has had wounds ( left leg ) flaccid blisters, for about 4 months  getting worse now. Admitted for cellulitis management.  67-year-old male with PMHx of A. fib on Eliquis, hypertension, hyperlipidemia, prostate cancer s/p prostatectomy, CABG x2 stents, hx of chronic venous stasis p/w left lower leg wounds with pain/erythema admitted for L LE cellulitis.

## 2022-10-12 NOTE — PROGRESS NOTE ADULT - TIME BILLING
Pt seen + examined. Case discussed with resident. Agree with assessment and plan above (edited by me in detail):  Time spent: 40min. More than 50% of the visit was spent counseling the patient on medical condition -- L LE cellulitis, zosyn, possible po Abx options,     67-year-old male with PMHx of A. fib on Eliquis, hypertension, hyperlipidemia, prostate cancer s/p prostatectomy, CABG x2 stents, hx of chronic venous stasis p/w left lower leg wounds with pain/erythema admitted for L LE cellulitis.    - Hx of chronic venous stasis, admitted with left lower leg wounds and LLE cellulitis  - Leucocytosis 10k downtrending to 9.58(10/12/22), afebrile, doesn't meet SIRS criteria    - BCx shows no growth to date  - MRSA, MSSA PCR negative  - reportedly had some yellow/greenish discharge before admission that resolved with Abx (no wound Cx available and currently not having discharge)  - unclear whether that discharge may have been a sign of Pseudomonas infection  - Continue Zosyn 3.375g IV Q8h for now and transition to oral Abx when deemed appropriate by ID  - cellulitis gradually improving  - encouraged pt to elevate LE  - ID Dr. Ny consulted, recs appreciated  - Wound care (Abdiel) consulted, recs appreciated  - PT consulted, recs appreciated

## 2022-10-12 NOTE — PROGRESS NOTE ADULT - PROBLEM SELECTOR PLAN 4
- chronic  - at home BP meds, losartan/HCTZ 100mg-25mg daily , clonidine 0.3mg daily , Toprol XL 100mg daily continue with hold parameters   - will monitor cmp am  - DASH/TLC  - monitor BP & titrate BP meds PRN. - chronic  - at home BP meds, losartan/HCTZ 100mg-25mg daily , clonidine 0.3mg daily, Toprol XL 100mg daily continue with hold parameters   - DASH/TLC  - monitor BP & titrate BP meds PRN.

## 2022-10-12 NOTE — PROGRESS NOTE ADULT - SUBJECTIVE AND OBJECTIVE BOX
[facility]  INFECTIOUS DISEASES   [address] [place] [address]  Tel: [phone]     Fax: [phone]  ======================================================  MD Wilmer Boston Kaushal, MD Cho, Michelle, MD   ======================================================    [id number]  DELIA ELOISA     Follow up: LLE cellulitis     No fever, swelling slightly better. No pain. Mild warmth.     PAST MEDICAL & SURGICAL HISTORY:  Atrial fibrillation, unspecified type  Hypertension  HLD (hyperlipidemia)  Prostate cancer  H/O prostatectomy  Presence of stent in artery    Social Hx: no smoking, ETOH Or drugs     FAMILY HISTORY:  No pertinent family history in first degree relatives    Allergies  No Known Allergies    Antibiotics:  MEDICATIONS  (STANDING):  apixaban 5 milliGRAM(s) Oral every 12 hours  aspirin enteric coated 81 milliGRAM(s) Oral daily  atorvastatin 40 milliGRAM(s) Oral at bedtime  cloNIDine 0.3 milliGRAM(s) Oral at bedtime  gabapentin 100 milliGRAM(s) Oral every 8 hours  hydrochlorothiazide 25 milliGRAM(s) Oral daily  losartan 100 milliGRAM(s) Oral daily  multivitamin 1 Tablet(s) Oral daily  piperacillin/tazobactam IVPB.. 3.375 Gram(s) IV Intermittent every 8 hours  vancomycin  IVPB 1250 milliGRAM(s) IV Intermittent every 12 hours     REVIEW OF SYSTEMS:  CONSTITUTIONAL:  No Fever or chills  HEENT:  No diplopia or blurred vision.  No sore throat or runny nose.  CARDIOVASCULAR:  No chest pain or SOB.  RESPIRATORY:  No cough, shortness of breath, PND or orthopnea.  GASTROINTESTINAL:  No nausea, vomiting or diarrhea.  GENITOURINARY:  No dysuria, frequency or urgency. No Blood in urine  MUSCULOSKELETAL:  leg swelling and ulcers   SKIN:  leg ulcers   NEUROLOGIC:  No paresthesias, fasciculations, seizures or weakness.  PSYCHIATRIC:  No disorder of thought or mood.  ENDOCRINE:  No heat or cold intolerance, polyuria or polydipsia.  HEMATOLOGICAL:  No easy bruising or bleeding.     Physical Exam:  Vital Signs Last 24 Hrs  T(C): 36.4 (12 Oct 2022 11:38), Max: 36.8 (12 Oct 2022 05:08)  T(F): 97.6 (12 Oct 2022 11:38), Max: 98.2 (12 Oct 2022 05:08)  HR: 76 (12 Oct 2022 11:38) (72 - 82)  BP: 145/91 (12 Oct 2022 11:38) (133/91 - 152/100)  BP(mean): --  RR: 18 (12 Oct 2022 11:38) (17 - 18)  SpO2: 95% (12 Oct 2022 11:38) (95% - 96%)  Parameters below as of 12 Oct 2022 11:38  Patient On (Oxygen Delivery Method): room air  GEN: NAD, obese   HEENT: normocephalic and atraumatic. EOMI. PERRL.    NECK: Supple.  No lymphadenopathy   LUNGS: Clear to auscultation.  HEART: Regular rate and rhythm without murmur.  ABDOMEN: Soft, nontender, and nondistended.  Positive bowel sounds.    : No CVA tenderness  EXTREMITIES: Chronic skin changes in both lower legs  Left leg with multiple dry ulcers, below knee leg is swollen with warmth  erythema and tenderness, no purulent discharge or fluctuation.    NEUROLOGIC: grossly intact.  PSYCHIATRIC: Appropriate affect .  SKIN: No rash, legs as above       Labs:                        14.0   9.58  )-----------( 238      ( 12 Oct 2022 08:20 )             39.5     10-12    141  |  106  |  27<H>  ----------------------------<  109<H>  3.3<L>   |  26  |  1.20    Ca    9.5      12 Oct 2022 08:20  Mg     2.3     10-11    TPro  7.9  /  Alb  3.7  /  TBili  1.1  /  DBili  x   /  AST  21  /  ALT  30  /  AlkPhos  78  10-11    Culture - Blood (collected 10-09-22 @ 19:22)  Source: .Blood Blood-Peripheral    Culture - Blood (collected 10-09-22 @ 19:17)  Source: .Blood Blood-Peripheral    WBC Count: 9.58 K/uL (10-12-22 @ 08:20)  WBC Count: 8.76 K/uL (10-11-22 @ 05:44)  WBC Count: 9.17 K/uL (10-10-22 @ 09:37)  WBC Count: 10.52 K/uL (10-09-22 @ 19:17)    Creatinine, Serum: 1.20 mg/dL (10-12-22 @ 08:20)  Creatinine, Serum: 1.20 mg/dL (10-11-22 @ 05:44)  Creatinine, Serum: 1.30 mg/dL (10-10-22 @ 09:37)  Creatinine, Serum: 1.20 mg/dL (10-09-22 @ 19:17)    C-Reactive Protein, Serum: 6 mg/L (10-09-22 @ 19:17)    Sedimentation Rate, Erythrocyte: 21 mm/hr (10-09-22 @ 19:17)     COVID-19 PCR: NotDetec (10-09-22 @ 23:09)    All imaging and other data have been reviewed.  < from: US Duplex Venous Lower Ext Ltd, Left (10.09.22 @ 20:31) >  IMPRESSION:  No evidence of left lower extremity deep venous thrombosis.    < from: Xray Tibia + Fibula 2 Views, Left (10.09.22 @ 20:40) >  IMPRESSION:   No acute radiographic osseous pathology..    Assessment and Plan:   66yo man with PMH of HTN, CAD s/p CABG, A. fib, prostate cancer s/p prostatectomy, and chronic venous stasis was admitted with leg ulcers and cellulitis.   He had blisters and swelling in legs that opened up causing ulcers that is going on for about 6months. Seen vascular doctor in the past stating that had good arterial circulation.   Feels burning pain in both legs. No fever or chills. Not seen in wound center.      Recommendations:  - Blood cultures NGTD  - MRSA PCR negative   - Vascular consult and wound care consults noted   - Continue zosyn 3.375gm q8  - Soon can be changed to oral ABx and discharge    Will follow.    Lupe Ny MD  Division of Infectious Diseases   Please call ID service at [phone] with any question.      35 minutes spent on total encounter assessing patient, examination, chart review, counseling and coordinating care by the attending physician/nurse/care manager.

## 2022-10-13 DIAGNOSIS — R29.3 ABNORMAL POSTURE: ICD-10-CM

## 2022-10-13 LAB
ALBUMIN SERPL ELPH-MCNC: 3.4 G/DL — SIGNIFICANT CHANGE UP (ref 3.3–5)
ALP SERPL-CCNC: 65 U/L — SIGNIFICANT CHANGE UP (ref 40–120)
ALT FLD-CCNC: 33 U/L — SIGNIFICANT CHANGE UP (ref 12–78)
ANION GAP SERPL CALC-SCNC: 7 MMOL/L — SIGNIFICANT CHANGE UP (ref 5–17)
AST SERPL-CCNC: 18 U/L — SIGNIFICANT CHANGE UP (ref 15–37)
BILIRUB SERPL-MCNC: 0.6 MG/DL — SIGNIFICANT CHANGE UP (ref 0.2–1.2)
BUN SERPL-MCNC: 23 MG/DL — SIGNIFICANT CHANGE UP (ref 7–23)
CALCIUM SERPL-MCNC: 9.9 MG/DL — SIGNIFICANT CHANGE UP (ref 8.5–10.1)
CHLORIDE SERPL-SCNC: 105 MMOL/L — SIGNIFICANT CHANGE UP (ref 96–108)
CO2 SERPL-SCNC: 30 MMOL/L — SIGNIFICANT CHANGE UP (ref 22–31)
CREAT SERPL-MCNC: 1.3 MG/DL — SIGNIFICANT CHANGE UP (ref 0.5–1.3)
EGFR: 60 ML/MIN/1.73M2 — SIGNIFICANT CHANGE UP
FOLATE SERPL-MCNC: 13.5 NG/ML — SIGNIFICANT CHANGE UP
GLUCOSE SERPL-MCNC: 102 MG/DL — HIGH (ref 70–99)
HCT VFR BLD CALC: 40.3 % — SIGNIFICANT CHANGE UP (ref 39–50)
HGB BLD-MCNC: 13.8 G/DL — SIGNIFICANT CHANGE UP (ref 13–17)
MAGNESIUM SERPL-MCNC: 2.3 MG/DL — SIGNIFICANT CHANGE UP (ref 1.6–2.6)
MCHC RBC-ENTMCNC: 31.3 PG — SIGNIFICANT CHANGE UP (ref 27–34)
MCHC RBC-ENTMCNC: 34.2 GM/DL — SIGNIFICANT CHANGE UP (ref 32–36)
MCV RBC AUTO: 91.4 FL — SIGNIFICANT CHANGE UP (ref 80–100)
NRBC # BLD: 0 /100 WBCS — SIGNIFICANT CHANGE UP (ref 0–0)
PLATELET # BLD AUTO: 240 K/UL — SIGNIFICANT CHANGE UP (ref 150–400)
POTASSIUM SERPL-MCNC: 4.5 MMOL/L — SIGNIFICANT CHANGE UP (ref 3.5–5.3)
POTASSIUM SERPL-SCNC: 4.5 MMOL/L — SIGNIFICANT CHANGE UP (ref 3.5–5.3)
PROT SERPL-MCNC: 7.3 G/DL — SIGNIFICANT CHANGE UP (ref 6–8.3)
RBC # BLD: 4.41 M/UL — SIGNIFICANT CHANGE UP (ref 4.2–5.8)
RBC # FLD: 13.5 % — SIGNIFICANT CHANGE UP (ref 10.3–14.5)
SODIUM SERPL-SCNC: 142 MMOL/L — SIGNIFICANT CHANGE UP (ref 135–145)
VIT B12 SERPL-MCNC: 653 PG/ML — SIGNIFICANT CHANGE UP (ref 232–1245)
WBC # BLD: 8.79 K/UL — SIGNIFICANT CHANGE UP (ref 3.8–10.5)
WBC # FLD AUTO: 8.79 K/UL — SIGNIFICANT CHANGE UP (ref 3.8–10.5)

## 2022-10-13 PROCEDURE — ZZZZZ: CPT

## 2022-10-13 PROCEDURE — 99232 SBSQ HOSP IP/OBS MODERATE 35: CPT | Mod: GC

## 2022-10-13 PROCEDURE — 99233 SBSQ HOSP IP/OBS HIGH 50: CPT

## 2022-10-13 PROCEDURE — 72148 MRI LUMBAR SPINE W/O DYE: CPT | Mod: 26

## 2022-10-13 RX ORDER — GABAPENTIN 400 MG/1
300 CAPSULE ORAL
Refills: 0 | Status: DISCONTINUED | OUTPATIENT
Start: 2022-10-13 | End: 2022-10-14

## 2022-10-13 RX ADMIN — Medication 81 MILLIGRAM(S): at 11:10

## 2022-10-13 RX ADMIN — PIPERACILLIN AND TAZOBACTAM 25 GRAM(S): 4; .5 INJECTION, POWDER, LYOPHILIZED, FOR SOLUTION INTRAVENOUS at 14:31

## 2022-10-13 RX ADMIN — Medication 100 MILLIGRAM(S): at 05:50

## 2022-10-13 RX ADMIN — GABAPENTIN 100 MILLIGRAM(S): 400 CAPSULE ORAL at 05:50

## 2022-10-13 RX ADMIN — PIPERACILLIN AND TAZOBACTAM 25 GRAM(S): 4; .5 INJECTION, POWDER, LYOPHILIZED, FOR SOLUTION INTRAVENOUS at 21:39

## 2022-10-13 RX ADMIN — ATORVASTATIN CALCIUM 40 MILLIGRAM(S): 80 TABLET, FILM COATED ORAL at 21:39

## 2022-10-13 RX ADMIN — APIXABAN 5 MILLIGRAM(S): 2.5 TABLET, FILM COATED ORAL at 17:05

## 2022-10-13 RX ADMIN — Medication 250 MILLIGRAM(S): at 17:05

## 2022-10-13 RX ADMIN — PIPERACILLIN AND TAZOBACTAM 25 GRAM(S): 4; .5 INJECTION, POWDER, LYOPHILIZED, FOR SOLUTION INTRAVENOUS at 05:49

## 2022-10-13 RX ADMIN — Medication 250 MILLIGRAM(S): at 05:49

## 2022-10-13 RX ADMIN — APIXABAN 5 MILLIGRAM(S): 2.5 TABLET, FILM COATED ORAL at 05:50

## 2022-10-13 RX ADMIN — GABAPENTIN 300 MILLIGRAM(S): 400 CAPSULE ORAL at 17:05

## 2022-10-13 RX ADMIN — Medication 0.3 MILLIGRAM(S): at 21:39

## 2022-10-13 RX ADMIN — Medication 1 TABLET(S): at 11:10

## 2022-10-13 RX ADMIN — Medication 650 MILLIGRAM(S): at 10:49

## 2022-10-13 RX ADMIN — Medication 25 MILLIGRAM(S): at 05:50

## 2022-10-13 RX ADMIN — Medication 650 MILLIGRAM(S): at 09:33

## 2022-10-13 RX ADMIN — LOSARTAN POTASSIUM 100 MILLIGRAM(S): 100 TABLET, FILM COATED ORAL at 05:50

## 2022-10-13 NOTE — PROGRESS NOTE ADULT - PROBLEM SELECTOR PLAN 1
Pt evaluated and chart reviewed.  - Pt seen bedside in room, laying comfortably with legs elevated.  - b/l with venous stasis changes, wound are completely dry on exam today. Pt relates they began to improve and stopped draining once the IV abx were started in the ED on Joe 10/9/22.  - Vdup - neg for DVT, TibFibXR - neg for osseous pathology  - Protective Aquacell and DSD applied to LLE. Wound culture not obtained due to dry nature of the wounds during initial encounter.  - C/w IV antibiotics at this time    Podiatry to follow. Pt evaluated and chart reviewed.  - Pt seen bedside in room, laying comfortably with legs elevated.  - b/l with venous stasis changes, wound are completely dry on exam today. Pt relates they began to improve and stopped draining once the IV abx were started in the ED on Joe 10/9/22.  - Vdup - neg for DVT, TibFibXR - neg for osseous pathology  - Protective Aquacell and DSD applied to LLE. Wound culture not obtained due to dry nature of the wounds during initial encounter.    1. Cleanse wound with sterile saline solution and gently pat dry.  2. Dress wound with protective dry, sterile dressing, do not debride scabs - allow to fall off on their own with regular washing.   3. Change dressings daily and monitor for any signs of infection.  4. Patient is to follow up in [facility] on [address] [place] [address] with Dr. Cifuentes or Dr. Bolden / Dr. Manuel within 1 week upon discharge. Tel: [phone]

## 2022-10-13 NOTE — PROGRESS NOTE ADULT - SUBJECTIVE AND OBJECTIVE BOX
neuro cons dict  seen for unsteady/fall  likely peripheral including-  peripheral neuropathy/LR  Element of peripheral vestibulopathy  Brain mri -unremarkable  neuropathic w/u  mri of lumbar spine  PT

## 2022-10-13 NOTE — PROGRESS NOTE ADULT - PROBLEM SELECTOR PLAN 4
- Resolved as pt reports having normal bowel movement last night.   - C/w probiotics  - F/u on electrolytes, oral hydration.

## 2022-10-13 NOTE — PROGRESS NOTE ADULT - SUBJECTIVE AND OBJECTIVE BOX
67y year old Male seen at [facility] D1 for LLE venous stasis wounds. Protective dressing c/d/i on exam without strikethrough. Pt continues to complain of burning nerve pain to bilateral LE. Has walker and cane bedside, laying comfortably in bed. No new complaints.  Denies any fever, chills, nausea, vomiting, chest pain, shortness of breath, or calf pain at this time.    Allergies    No Known Allergies    Intolerances        MEDICATIONS  (STANDING):  apixaban 5 milliGRAM(s) Oral every 12 hours  aspirin enteric coated 81 milliGRAM(s) Oral daily  atorvastatin 40 milliGRAM(s) Oral at bedtime  cloNIDine 0.3 milliGRAM(s) Oral at bedtime  gabapentin 300 milliGRAM(s) Oral at bedtime  hydrochlorothiazide 25 milliGRAM(s) Oral daily  influenza  Vaccine (HIGH DOSE) 0.7 milliLiter(s) IntraMuscular once  losartan 100 milliGRAM(s) Oral daily  metoprolol succinate  milliGRAM(s) Oral daily  multivitamin 1 Tablet(s) Oral daily  piperacillin/tazobactam IVPB.. 3.375 Gram(s) IV Intermittent every 8 hours  saccharomyces boulardii 250 milliGRAM(s) Oral two times a day    MEDICATIONS  (PRN):  acetaminophen     Tablet .. 650 milliGRAM(s) Oral every 6 hours PRN Temp greater or equal to 38C (100.4F), Mild Pain (1 - 3)  aluminum hydroxide/magnesium hydroxide/simethicone Suspension 30 milliLiter(s) Oral every 4 hours PRN Dyspepsia  melatonin 3 milliGRAM(s) Oral at bedtime PRN Insomnia  ondansetron Injectable 4 milliGRAM(s) IV Push every 8 hours PRN Nausea and/or Vomiting      Vital Signs Last 24 Hrs  T(C): 36.9 (14 Oct 2022 11:32), Max: 36.9 (14 Oct 2022 11:32)  T(F): 98.4 (14 Oct 2022 11:32), Max: 98.4 (14 Oct 2022 11:32)  HR: 76 (14 Oct 2022 11:32) (69 - 76)  BP: 142/96 (14 Oct 2022 11:32) (142/96 - 158/87)  BP(mean): --  RR: 18 (14 Oct 2022 11:32) (18 - 18)  SpO2: 94% (14 Oct 2022 11:32) (94% - 95%)    Parameters below as of 14 Oct 2022 11:32  Patient On (Oxygen Delivery Method): room air        PHYSICAL EXAM:  Vascular: DP & PT palpable bilaterally, Capillary refill 3 seconds, Digital hair scant bilaterally, mild edema to b/l LE  Neurological: Light touch sensation intact bilaterally, nerve pain to b/l LE  Musculoskeletal: 5/5 strength in all quadrants bilaterally, AJ & STJ ROM intact  Dermatological: Multiple dried scabs noted to the anterior and medial aspect of the LLE with surrounding venous stasis dermatitis changes,  no periwound erythema, no fluctuance, no malodor, no proximal streaking at this time. No signs of infection at this time. RLE with venous stasis dermatitis changes. No actively open wounds b/l    CBC Full  -  ( 13 Oct 2022 06:54 )  WBC Count : 8.79 K/uL  RBC Count : 4.41 M/uL  Hemoglobin : 13.8 g/dL  Hematocrit : 40.3 %  Platelet Count - Automated : 240 K/uL  Mean Cell Volume : 91.4 fl  Mean Cell Hemoglobin : 31.3 pg  Mean Cell Hemoglobin Concentration : 34.2 gm/dL  Auto Neutrophil # : x  Auto Lymphocyte # : x  Auto Monocyte # : x  Auto Eosinophil # : x  Auto Basophil # : x  Auto Neutrophil % : x  Auto Lymphocyte % : x  Auto Monocyte % : x  Auto Eosinophil % : x  Auto Basophil % : x                            13.8   8.79  )-----------( 240      ( 13 Oct 2022 06:54 )             40.3             10-14    141  |  107  |  22  ----------------------------<  100<H>  3.5   |  24  |  1.30    Ca    9.4      14 Oct 2022 07:55  Mg     2.3     10-13    TPro  6.8  /  Alb  x   /  TBili  x   /  DBili  x   /  AST  x   /  ALT  x   /  AlkPhos  x   10-14        ACC: [id number] EXAM: MR SPINE LUMBAR    PROCEDURE DATE: 10/13/2022        INTERPRETATION: Clinical indication: Unsteady gait.    MRI of the lumbar spine was performed using sagittal T1-T2 and STIR sequence. Axial T1 and T2-weighted sequences were performed as well as coronal T1-weighted sequence.    Reversal of the normal lumbar lordosis is seen.    Mild scoliosis seen.    There is a grade 1 anterolisthesis seen involving L5 and S1. There is evidence of bilateral lysis defects suspected at this level. This finding can be confirmed with plain film.    Disc desiccation is seen throughout the lower thoracic and lumbar spine region. This is most prominent the L5-S1 level and is secondary to chronic degenerative change    Schmorl's nodes are seen at multiple levels secondary to chronic degenerative change    Mild compression deformity is seen involving the T11 vertebral body. No associated areas of edema or abnormal signal seen to suggest underlying pathologic process. No significant retropulsed fragment is seen.    T11-12: Normal.    T12-L1: Disc bulge and bilateral hypertrophic facet changes. Severe narrowing of both neural foramen    L1-2: Bilateral hypertrophic facet joint changes are seen. Mild narrowing of both neural foramen    L2-3: Bilateral hypertrophic facet changes. Moderate narrowing of the right neural foramen. Mild narrowing of the spinal canal.    L3-4: Disc bulge and bilateral hypertrophic facet changes. Moderate narrowing of the spinal canal. Mild narrowing of both neural foramen    L4-5: Disc bulge and bilateral hypertrophic facet joint changes. Mild narrowing of the right neural foramen. Moderate narrowing of the left neural foramen    L5-S1: Disc bulge and bilateral hypertrophic facet changes. Mild narrowing of both neural foramen    The conus ends at L2 and appears normal    Evaluation of the paraspinal soft tissues appear normal.    IMPRESSION: Degenerative changes as described above.    --- End of Report ---

## 2022-10-13 NOTE — PROGRESS NOTE ADULT - SUBJECTIVE AND OBJECTIVE BOX
Patient is a 67y old  Male who presents with a chief complaint of LLE cellulitis (12 Oct 2022 12:28)      Subjective:  INTERVAL HPI/OVERNIGHT EVENTS: Patient seen and examined at bedside. No overnight events occurred. Patient has no complaints at this time. Denies fevers, chills, headache, lightheadedness, chest pain, dyspnea, abdominal pain, n/v/d/c.    MEDICATIONS  (STANDING):  apixaban 5 milliGRAM(s) Oral every 12 hours  aspirin enteric coated 81 milliGRAM(s) Oral daily  atorvastatin 40 milliGRAM(s) Oral at bedtime  cloNIDine 0.3 milliGRAM(s) Oral at bedtime  gabapentin 100 milliGRAM(s) Oral every 8 hours  hydrochlorothiazide 25 milliGRAM(s) Oral daily  influenza  Vaccine (HIGH DOSE) 0.7 milliLiter(s) IntraMuscular once  losartan 100 milliGRAM(s) Oral daily  metoprolol succinate  milliGRAM(s) Oral daily  multivitamin 1 Tablet(s) Oral daily  piperacillin/tazobactam IVPB.. 3.375 Gram(s) IV Intermittent every 8 hours  saccharomyces boulardii 250 milliGRAM(s) Oral two times a day    MEDICATIONS  (PRN):  acetaminophen     Tablet .. 650 milliGRAM(s) Oral every 6 hours PRN Temp greater or equal to 38C (100.4F), Mild Pain (1 - 3)  aluminum hydroxide/magnesium hydroxide/simethicone Suspension 30 milliLiter(s) Oral every 4 hours PRN Dyspepsia  melatonin 3 milliGRAM(s) Oral at bedtime PRN Insomnia  ondansetron Injectable 4 milliGRAM(s) IV Push every 8 hours PRN Nausea and/or Vomiting      Allergies    No Known Allergies    Intolerances        REVIEW OF SYSTEMS:  CONSTITUTIONAL: No fever or chills  HEENT:  No headache, no sore throat  RESPIRATORY: No cough, wheezing, or shortness of breath  CARDIOVASCULAR: No chest pain, palpitations  GASTROINTESTINAL: No abd pain, nausea, vomiting, or diarrhea  GENITOURINARY: No dysuria, frequency, or hematuria  NEUROLOGICAL: no focal weakness or dizziness  MUSCULOSKELETAL: no myalgias     Objective:  Vital Signs Last 24 Hrs  T(C): 36.6 (13 Oct 2022 04:45), Max: 36.7 (12 Oct 2022 21:49)  T(F): 97.8 (13 Oct 2022 04:45), Max: 98 (12 Oct 2022 21:49)  HR: 61 (13 Oct 2022 04:45) (61 - 76)  BP: 154/92 (13 Oct 2022 04:45) (142/79 - 154/92)  BP(mean): --  RR: 18 (13 Oct 2022 04:45) (18 - 18)  SpO2: 98% (13 Oct 2022 04:45) (94% - 98%)    Parameters below as of 13 Oct 2022 04:45  Patient On (Oxygen Delivery Method): room air        GENERAL: NAD, lying in bed comfortably  HEAD:  Atraumatic, Normocephalic  EYES: EOMI, PERRLA, conjunctiva and sclera clear  ENT: Moist mucous membranes  NECK: Supple, No JVD  CHEST/LUNG: Clear to auscultation bilaterally; No rales, rhonchi, wheezing, or rubs. Unlabored respirations  HEART: Regular rate and rhythm; No murmurs, rubs, or gallops  ABDOMEN: Bowel sounds present; Soft, Nontender, Nondistended. No hepatomegaly  EXTREMITIES:  2+ Peripheral Pulses, brisk capillary refill. No clubbing, cyanosis, or edema  NERVOUS SYSTEM:  Alert & Oriented X3, speech clear. No deficits   MSK: FROM all 4 extremities, full and equal strength  SKIN: No rashes or lesions    LABS:                        13.8   8.79  )-----------( 240      ( 13 Oct 2022 06:54 )             40.3     CBC Full  -  ( 13 Oct 2022 06:54 )  WBC Count : 8.79 K/uL  Hemoglobin : 13.8 g/dL  Hematocrit : 40.3 %  Platelet Count - Automated : 240 K/uL  Mean Cell Volume : 91.4 fl  Mean Cell Hemoglobin : 31.3 pg  Mean Cell Hemoglobin Concentration : 34.2 gm/dL  Auto Neutrophil # : x  Auto Lymphocyte # : x  Auto Monocyte # : x  Auto Eosinophil # : x  Auto Basophil # : x  Auto Neutrophil % : x  Auto Lymphocyte % : x  Auto Monocyte % : x  Auto Eosinophil % : x  Auto Basophil % : x    13 Oct 2022 06:54    142    |  105    |  23     ----------------------------<  102    4.5     |  30     |  1.30     Ca    9.9        13 Oct 2022 06:54  Mg     2.3       13 Oct 2022 06:54    TPro  7.3    /  Alb  3.4    /  TBili  0.6    /  DBili  x      /  AST  18     /  ALT  33     /  AlkPhos  65     13 Oct 2022 06:54        CAPILLARY BLOOD GLUCOSE            Culture - Blood (collected 10-09-22 @ 19:22)  Source: .Blood Blood-Peripheral  Preliminary Report (10-11-22 @ 02:01):    No growth to date.    Culture - Blood (collected 10-09-22 @ 19:17)  Source: .Blood Blood-Peripheral  Preliminary Report (10-11-22 @ 02:01):    No growth to date.        RADIOLOGY & ADDITIONAL TESTS:    Personally reviewed.     Consultant(s) Notes Reviewed:  [x] YES  [ ] NO     Patient is a 67y old  Male who presents with a chief complaint of LLE cellulitis (12 Oct 2022 12:28)      Subjective:  INTERVAL HPI/OVERNIGHT EVENTS: Patient seen and examined at bedside. No overnight events occurred. Patient was sleeping comfortably in bed, was alert and oriented when i woke him up and spoke to him. Patient still complains of mild burning and neuropathic pain in his left leg which he reports getting better. Patient reports no discharge from his wound so far. He mentioned having normal bowel movement last night with no more diarrhea. Denies fevers, chills, headache, lightheadedness, chest pain, dyspnea, abdominal pain, n/v/d/c.    MEDICATIONS  (STANDING):  apixaban 5 milliGRAM(s) Oral every 12 hours  aspirin enteric coated 81 milliGRAM(s) Oral daily  atorvastatin 40 milliGRAM(s) Oral at bedtime  cloNIDine 0.3 milliGRAM(s) Oral at bedtime  gabapentin 100 milliGRAM(s) Oral every 8 hours  hydrochlorothiazide 25 milliGRAM(s) Oral daily  influenza  Vaccine (HIGH DOSE) 0.7 milliLiter(s) IntraMuscular once  losartan 100 milliGRAM(s) Oral daily  metoprolol succinate  milliGRAM(s) Oral daily  multivitamin 1 Tablet(s) Oral daily  piperacillin/tazobactam IVPB.. 3.375 Gram(s) IV Intermittent every 8 hours  saccharomyces boulardii 250 milliGRAM(s) Oral two times a day    MEDICATIONS  (PRN):  acetaminophen     Tablet .. 650 milliGRAM(s) Oral every 6 hours PRN Temp greater or equal to 38C (100.4F), Mild Pain (1 - 3)  aluminum hydroxide/magnesium hydroxide/simethicone Suspension 30 milliLiter(s) Oral every 4 hours PRN Dyspepsia  melatonin 3 milliGRAM(s) Oral at bedtime PRN Insomnia  ondansetron Injectable 4 milliGRAM(s) IV Push every 8 hours PRN Nausea and/or Vomiting      Allergies    No Known Allergies    Intolerances        REVIEW OF SYSTEMS:  CONSTITUTIONAL: No fever or chills  HEENT:  No headache, no sore throat  RESPIRATORY: No cough, wheezing, or shortness of breath  CARDIOVASCULAR: No chest pain, palpitations  GASTROINTESTINAL: No abd pain, nausea, vomiting, or diarrhea.  GENITOURINARY: No dysuria, frequency, or hematuria  NEUROLOGICAL: no focal weakness or dizziness, + spills of loosing balance that happened in afternoons, chronic. + Neuropathic pain in LE bilaterally.  MUSCULOSKELETAL: no myalgias     Objective:  Vital Signs Last 24 Hrs  T(C): 36.6 (13 Oct 2022 04:45), Max: 36.7 (12 Oct 2022 21:49)  T(F): 97.8 (13 Oct 2022 04:45), Max: 98 (12 Oct 2022 21:49)  HR: 61 (13 Oct 2022 04:45) (61 - 76)  BP: 154/92 (13 Oct 2022 04:45) (142/79 - 154/92)  BP(mean): --  RR: 18 (13 Oct 2022 04:45) (18 - 18)  SpO2: 98% (13 Oct 2022 04:45) (94% - 98%)    Parameters below as of 13 Oct 2022 04:45  Patient On (Oxygen Delivery Method): room air        GENERAL: NAD, lying in bed comfortably  HEAD:  Atraumatic, Normocephalic  EYES: EOMI, PERRLA, conjunctiva and sclera clear  ENT: Moist mucous membranes  NECK: Supple, No JVD  CHEST/LUNG: Clear to auscultation bilaterally; No rales, rhonchi, wheezing, or rubs. Unlabored respirations  HEART: Irregular rhythm; No murmurs, rubs, or gallops  ABDOMEN: Bowel sounds present; Soft, Nontender, Nondistended. No hepatomegaly  EXTREMITIES:  2+ Peripheral Pulses, brisk capillary refill. No clubbing, cyanosis, or edema, both legs show signs of chronic venous stasis, left lower leg covered by dressing which is C/D/I.  NERVOUS SYSTEM:  Alert & Oriented X3, speech clear. No deficits   MSK: FROM all 4 extremities, full and equal strength  SKIN: No rashes or lesions    LABS:                        13.8   8.79  )-----------( 240      ( 13 Oct 2022 06:54 )             40.3     CBC Full  -  ( 13 Oct 2022 06:54 )  WBC Count : 8.79 K/uL  Hemoglobin : 13.8 g/dL  Hematocrit : 40.3 %  Platelet Count - Automated : 240 K/uL  Mean Cell Volume : 91.4 fl  Mean Cell Hemoglobin : 31.3 pg  Mean Cell Hemoglobin Concentration : 34.2 gm/dL  Auto Neutrophil # : x  Auto Lymphocyte # : x  Auto Monocyte # : x  Auto Eosinophil # : x  Auto Basophil # : x  Auto Neutrophil % : x  Auto Lymphocyte % : x  Auto Monocyte % : x  Auto Eosinophil % : x  Auto Basophil % : x    13 Oct 2022 06:54    142    |  105    |  23     ----------------------------<  102    4.5     |  30     |  1.30     Ca    9.9        13 Oct 2022 06:54  Mg     2.3       13 Oct 2022 06:54    TPro  7.3    /  Alb  3.4    /  TBili  0.6    /  DBili  x      /  AST  18     /  ALT  33     /  AlkPhos  65     13 Oct 2022 06:54        CAPILLARY BLOOD GLUCOSE            Culture - Blood (collected 10-09-22 @ 19:22)  Source: .Blood Blood-Peripheral  Preliminary Report (10-11-22 @ 02:01):    No growth to date.    Culture - Blood (collected 10-09-22 @ 19:17)  Source: .Blood Blood-Peripheral  Preliminary Report (10-11-22 @ 02:01):    No growth to date.        RADIOLOGY & ADDITIONAL TESTS:    Personally reviewed.     Consultant(s) Notes Reviewed:  [x] YES  [ ] NO     Patient is a 67y old  Male who presents with a chief complaint of LLE cellulitis (12 Oct 2022 12:28)      Subjective:  INTERVAL HPI/OVERNIGHT EVENTS: Patient seen and examined at bedside. No overnight events occurred. Patient was sleeping comfortably in bed, was alert and oriented when i woke him up and spoke to him. Patient still complains of mild burning and neuropathic pain in his left leg which he reports getting better. Patient reports no discharge from his wound so far. He mentioned having normal bowel movement last night with no more diarrhea. Denies fevers, chills, headache, lightheadedness, chest pain, dyspnea, abdominal pain, n/v/d/c.    MEDICATIONS  (STANDING):  apixaban 5 milliGRAM(s) Oral every 12 hours  aspirin enteric coated 81 milliGRAM(s) Oral daily  atorvastatin 40 milliGRAM(s) Oral at bedtime  cloNIDine 0.3 milliGRAM(s) Oral at bedtime  gabapentin 100 milliGRAM(s) Oral every 8 hours  hydrochlorothiazide 25 milliGRAM(s) Oral daily  influenza  Vaccine (HIGH DOSE) 0.7 milliLiter(s) IntraMuscular once  losartan 100 milliGRAM(s) Oral daily  metoprolol succinate  milliGRAM(s) Oral daily  multivitamin 1 Tablet(s) Oral daily  piperacillin/tazobactam IVPB.. 3.375 Gram(s) IV Intermittent every 8 hours  saccharomyces boulardii 250 milliGRAM(s) Oral two times a day    MEDICATIONS  (PRN):  acetaminophen     Tablet .. 650 milliGRAM(s) Oral every 6 hours PRN Temp greater or equal to 38C (100.4F), Mild Pain (1 - 3)  aluminum hydroxide/magnesium hydroxide/simethicone Suspension 30 milliLiter(s) Oral every 4 hours PRN Dyspepsia  melatonin 3 milliGRAM(s) Oral at bedtime PRN Insomnia  ondansetron Injectable 4 milliGRAM(s) IV Push every 8 hours PRN Nausea and/or Vomiting      Allergies    No Known Allergies    Intolerances        REVIEW OF SYSTEMS:  CONSTITUTIONAL: No fever or chills  HEENT:  No headache, no sore throat  RESPIRATORY: No cough, wheezing, or shortness of breath  CARDIOVASCULAR: No chest pain, palpitations  GASTROINTESTINAL: No abd pain, nausea, vomiting, or diarrhea.  GENITOURINARY: No dysuria, frequency, or hematuria  NEUROLOGICAL: no focal weakness or dizziness, +intermittent unsteady gait, chronic. + Neuropathic pain in LE bilaterally.  MUSCULOSKELETAL: no myalgias     Objective:  Vital Signs Last 24 Hrs  T(C): 36.6 (13 Oct 2022 04:45), Max: 36.7 (12 Oct 2022 21:49)  T(F): 97.8 (13 Oct 2022 04:45), Max: 98 (12 Oct 2022 21:49)  HR: 61 (13 Oct 2022 04:45) (61 - 76)  BP: 154/92 (13 Oct 2022 04:45) (142/79 - 154/92)  BP(mean): --  RR: 18 (13 Oct 2022 04:45) (18 - 18)  SpO2: 98% (13 Oct 2022 04:45) (94% - 98%)    Parameters below as of 13 Oct 2022 04:45  Patient On (Oxygen Delivery Method): room air        GENERAL: NAD, lying in bed comfortably  HEAD:  Atraumatic, Normocephalic  EYES: EOMI, PERRLA, conjunctiva and sclera clear  ENT: Moist mucous membranes  NECK: Supple, No JVD  CHEST/LUNG: Clear to auscultation bilaterally; No rales, rhonchi, wheezing, or rubs. Unlabored respirations  HEART: Irregular rhythm; No murmurs, rubs, or gallops  ABDOMEN: Bowel sounds present; Soft, Nontender, Nondistended. No hepatomegaly  EXTREMITIES:  2+ Peripheral Pulses, brisk capillary refill. No clubbing, cyanosis, or edema, both legs show signs of chronic venous stasis, left lower leg covered by dressing which is C/D/I.  NERVOUS SYSTEM:  Alert & Oriented X3, speech clear. No deficits   MSK: FROM all 4 extremities, full and equal strength  SKIN: No rashes or lesions    LABS:                        13.8   8.79  )-----------( 240      ( 13 Oct 2022 06:54 )             40.3     CBC Full  -  ( 13 Oct 2022 06:54 )  WBC Count : 8.79 K/uL  Hemoglobin : 13.8 g/dL  Hematocrit : 40.3 %  Platelet Count - Automated : 240 K/uL  Mean Cell Volume : 91.4 fl  Mean Cell Hemoglobin : 31.3 pg  Mean Cell Hemoglobin Concentration : 34.2 gm/dL  Auto Neutrophil # : x  Auto Lymphocyte # : x  Auto Monocyte # : x  Auto Eosinophil # : x  Auto Basophil # : x  Auto Neutrophil % : x  Auto Lymphocyte % : x  Auto Monocyte % : x  Auto Eosinophil % : x  Auto Basophil % : x    13 Oct 2022 06:54    142    |  105    |  23     ----------------------------<  102    4.5     |  30     |  1.30     Ca    9.9        13 Oct 2022 06:54  Mg     2.3       13 Oct 2022 06:54    TPro  7.3    /  Alb  3.4    /  TBili  0.6    /  DBili  x      /  AST  18     /  ALT  33     /  AlkPhos  65     13 Oct 2022 06:54        CAPILLARY BLOOD GLUCOSE            Culture - Blood (collected 10-09-22 @ 19:22)  Source: .Blood Blood-Peripheral  Preliminary Report (10-11-22 @ 02:01):    No growth to date.    Culture - Blood (collected 10-09-22 @ 19:17)  Source: .Blood Blood-Peripheral  Preliminary Report (10-11-22 @ 02:01):    No growth to date.        RADIOLOGY & ADDITIONAL TESTS:  < from: MR Lumbar Spine No Cont (10.13.22 @ 12:06) >  ACC: [id number] EXAM:  MR SPINE LUMBAR                          PROCEDURE DATE:  10/13/2022          INTERPRETATION:  Clinical indication: Unsteady gait.    MRI of the lumbar spine was performed using sagittal T1-T2 and STIR   sequence. Axial T1 and T2-weighted sequences were performed as well as   coronal T1-weighted sequence.    Reversal of the normal lumbar lordosis is seen.    Mild scoliosis seen.    There is a grade 1 anterolisthesis seen involving L5 and S1. There is   evidence of bilaterallysis defects suspected at this level. This finding   can be confirmed with plain film.    Disc desiccation is seen throughout the lower thoracic and lumbar spine   region. This is most prominent the L5-S1 level and is secondary to   chronic degenerative change    Schmorl's nodes are seen at multiple levels secondary to chronic   degenerative change    Mild compression deformity is seen involving the T11 vertebral body. No   associated areas of edema or abnormal signal seen to suggest underlying   pathologic process. No significant retropulsed fragment is seen.    T11-12: Normal.    T12-L1: Disc bulge and bilateral hypertrophic facet changes. Severe   narrowing of both neural foramen    L1-2: Bilateral hypertrophic facet joint changes are seen. Mild narrowing   of both neural foramen    L2-3: Bilateral hypertrophic facet changes. Moderate narrowing of the   right neural foramen. Mild narrowing of the spinal canal.    L3-4: Disc bulge and bilateral hypertrophic facet changes. Moderate   narrowing of the spinal canal. Mild narrowing of both neural foramen    L4-5: Disc bulge and bilateral hypertrophic facet joint changes. Mild   narrowing of the right neural foramen. Moderate narrowing of the left   neural foramen    L5-S1: Disc bulge and bilateral hypertrophic facet changes. Mild   narrowing of both neural foramen    The conus ends at L2 and appears normal    Evaluation of the paraspinal soft tissues appear normal.    IMPRESSION: Degenerative changes as described above.    < end of copied text >    Personally reviewed.     Consultant(s) Notes Reviewed:  [x] YES  [ ] NO

## 2022-10-13 NOTE — PROGRESS NOTE ADULT - PROBLEM SELECTOR PLAN 1
- Hx of chronic venous stasis, admitted with left lower leg wounds and LLE cellulitis  - Leucocytosis resolved, afebrile, doesn't meet SIRS criteria    - BCx shows no growth to date  - MRSA, MSSA PCR negative  - Cellulitis gradually improving  - Continue Zosyn 3.375g IV Q8h for now and transition to oral Abx when deemed appropriate by ID  - encouraged pt to elevate LE  - ID Dr. Ny consulted, recs appreciated  - Wound care (Abdiel) consulted, recs appreciated  - PT consulted, recs appreciated

## 2022-10-13 NOTE — PROGRESS NOTE ADULT - ASSESSMENT
67-year-old male with PMHx of A. fib on Eliquis, hypertension, hyperlipidemia, prostate cancer s/p prostatectomy, CABG x2 stents, hx of chronic venous stasis p/w left lower leg wounds with pain/erythema admitted for L LE cellulitis.

## 2022-10-13 NOTE — PROGRESS NOTE ADULT - PROBLEM SELECTOR PLAN 2
- Patient reported spills of loosing balance while sitting, found to have his body lean toward one side.  - Orthostatics negative.  - MRI, MRA head shows No acute intracranial hemorrhage or acute infarct, no hemodynamically significant stenosis.  - Neuro (Finn) consulted, recs appreciated.  - F/u  MRI lumbar spines.  - F/u Folate and B12.  - F/u lyme serology.  - F/u serum protein electrophoresis. - Patient reported spills of loosing balance while sitting, found to have his body lean toward one side.  - Orthostatics negative.  - MRI, MRA head shows No acute intracranial hemorrhage or acute infarct, no hemodynamically significant stenosis.  - Neuro (Finn) consulted, recs appreciated.  - F/u  MRI lumbar spines ordered per neuro recs   - F/u Folate and B12.  - F/u lyme serology.  - F/u serum protein electrophoresis. - Patient reported spills of losing balance while sitting, found to have his body lean toward one side.  - Orthostatics negative.  - MRI, MRA head shows No acute intracranial hemorrhage or acute infarct, no hemodynamically significant stenosis.  - Neuro (Finn) consulted, recs appreciated.  - F/u  MRI lumbar spines ordered per neuro recs   - F/u Folate and B12.  - F/u lyme serology.  - F/u serum protein electrophoresis.

## 2022-10-13 NOTE — PROGRESS NOTE ADULT - SUBJECTIVE AND OBJECTIVE BOX
[facility]  INFECTIOUS DISEASES   [address] [place] [address]  Tel: [phone]     Fax: [phone]  ======================================================  MD Wilmer Boston Kaushal, MD Cho, Michelle, MD   ======================================================    [id number]  DELIA AMIN     Follow up: LLE cellulitis     No fever. No pain. Swelling, erythema, warmth and all better.     PAST MEDICAL & SURGICAL HISTORY:  Atrial fibrillation, unspecified type  Hypertension  HLD (hyperlipidemia)  Prostate cancer  H/O prostatectomy  Presence of stent in artery    Social Hx: no smoking, ETOH Or drugs     FAMILY HISTORY:  No pertinent family history in first degree relatives    Allergies  No Known Allergies    Antibiotics:  MEDICATIONS  (STANDING):  apixaban 5 milliGRAM(s) Oral every 12 hours  aspirin enteric coated 81 milliGRAM(s) Oral daily  atorvastatin 40 milliGRAM(s) Oral at bedtime  cloNIDine 0.3 milliGRAM(s) Oral at bedtime  gabapentin 100 milliGRAM(s) Oral every 8 hours  hydrochlorothiazide 25 milliGRAM(s) Oral daily  losartan 100 milliGRAM(s) Oral daily  multivitamin 1 Tablet(s) Oral daily  piperacillin/tazobactam IVPB.. 3.375 Gram(s) IV Intermittent every 8 hours  vancomycin  IVPB 1250 milliGRAM(s) IV Intermittent every 12 hours     REVIEW OF SYSTEMS:  CONSTITUTIONAL:  No Fever or chills  HEENT:  No diplopia or blurred vision.  No sore throat or runny nose.  CARDIOVASCULAR:  No chest pain or SOB.  RESPIRATORY:  No cough, shortness of breath, PND or orthopnea.  GASTROINTESTINAL:  No nausea, vomiting or diarrhea.  GENITOURINARY:  No dysuria, frequency or urgency. No Blood in urine  MUSCULOSKELETAL:  leg swelling and ulcers   SKIN:  leg ulcers   NEUROLOGIC:  No paresthesias, fasciculations, seizures or weakness.  PSYCHIATRIC:  No disorder of thought or mood.  ENDOCRINE:  No heat or cold intolerance, polyuria or polydipsia.  HEMATOLOGICAL:  No easy bruising or bleeding.     Physical Exam:  Vital Signs Last 24 Hrs  T(C): 36.7 (13 Oct 2022 12:06), Max: 36.7 (12 Oct 2022 21:49)  T(F): 98.1 (13 Oct 2022 12:06), Max: 98.1 (13 Oct 2022 12:06)  HR: 72 (13 Oct 2022 12:06) (61 - 72)  BP: 160/70 (13 Oct 2022 12:16) (142/79 - 170/97)  RR: 18 (13 Oct 2022 12:06) (18 - 18)  SpO2: 97% (13 Oct 2022 12:06) (94% - 98%)  Parameters below as of 13 Oct 2022 12:06  Patient On (Oxygen Delivery Method): room air  GEN: NAD, obese   HEENT: normocephalic and atraumatic. EOMI. PERRL.    NECK: Supple.  No lymphadenopathy   LUNGS: Clear to auscultation.  HEART: Regular rate and rhythm without murmur.  ABDOMEN: Soft, nontender, and nondistended.  Positive bowel sounds.    : No CVA tenderness  EXTREMITIES: Chronic skin changes in both lower legs  Left leg with multiple dry ulcers, below knee leg is swollen with warmth  erythema and tenderness, no purulent discharge or fluctuation.    NEUROLOGIC: grossly intact.  PSYCHIATRIC: Appropriate affect .  SKIN: No rash, legs as above     Labs:                        13.8   8.79  )-----------( 240      ( 13 Oct 2022 06:54 )             40.3     10-13    142  |  105  |  23  ----------------------------<  102<H>  4.5   |  30  |  1.30    Ca    9.9      13 Oct 2022 06:54  Mg     2.3     10-13    TPro  7.3  /  Alb  3.4  /  TBili  0.6  /  DBili  x   /  AST  18  /  ALT  33  /  AlkPhos  65  10-13    Culture - Blood (collected 10-09-22 @ 19:22)  Source: .Blood Blood-Peripheral    Culture - Blood (collected 10-09-22 @ 19:17)  Source: .Blood Blood-Peripheral    WBC Count: 8.79 K/uL (10-13-22 @ 06:54)  WBC Count: 9.58 K/uL (10-12-22 @ 08:20)  WBC Count: 8.76 K/uL (10-11-22 @ 05:44)  WBC Count: 9.17 K/uL (10-10-22 @ 09:37)  WBC Count: 10.52 K/uL (10-09-22 @ 19:17)    Creatinine, Serum: 1.30 mg/dL (10-13-22 @ 06:54)  Creatinine, Serum: 1.20 mg/dL (10-12-22 @ 08:20)  Creatinine, Serum: 1.20 mg/dL (10-11-22 @ 05:44)  Creatinine, Serum: 1.30 mg/dL (10-10-22 @ 09:37)  Creatinine, Serum: 1.20 mg/dL (10-09-22 @ 19:17)    C-Reactive Protein, Serum: 6 mg/L (10-09-22 @ 19:17)    Sedimentation Rate, Erythrocyte: 21 mm/hr (10-09-22 @ 19:17)    COVID-19 PCR: NotDetec (10-09-22 @ 23:09)    All imaging and other data have been reviewed.  < from: US Duplex Venous Lower Ext Ltd, Left (10.09.22 @ 20:31) >  IMPRESSION:  No evidence of left lower extremity deep venous thrombosis.    < from: Xray Tibia + Fibula 2 Views, Left (10.09.22 @ 20:40) >  IMPRESSION:   No acute radiographic osseous pathology..    Assessment and Plan:   68yo man with PMH of HTN, CAD s/p CABG, A. fib, prostate cancer s/p prostatectomy, and chronic venous stasis was admitted with leg ulcers and cellulitis.   He had blisters and swelling in legs that opened up causing ulcers that is going on for about 6months. Seen vascular doctor in the past stating that had good arterial circulation.   Feels burning pain in both legs. No fever or chills. Not seen in wound center.      Recommendations:  - Blood cultures NGTD  - MRSA PCR negative   - Vascular consult and wound care consults noted   - Continue zosyn 3.375gm q8  - Tomorrow an be changed to oral Augmentin 875mg q12 for one week and discharge with follow up in wound center.     Will follow PRN.    Lupe Ny MD  Division of Infectious Diseases   Please call ID service at [phone] with any question.      35 minutes spent on total encounter assessing patient, examination, chart review, counseling and coordinating care by the attending physician/nurse/care manager.

## 2022-10-14 LAB
ANION GAP SERPL CALC-SCNC: 10 MMOL/L — SIGNIFICANT CHANGE UP (ref 5–17)
B BURGDOR C6 AB SER-ACNC: NEGATIVE — SIGNIFICANT CHANGE UP
B BURGDOR IGG+IGM SER-ACNC: 0.26 INDEX — SIGNIFICANT CHANGE UP (ref 0.01–0.89)
BASOPHILS # BLD AUTO: 0.05 K/UL — SIGNIFICANT CHANGE UP (ref 0–0.2)
BASOPHILS NFR BLD AUTO: 0.5 % — SIGNIFICANT CHANGE UP (ref 0–2)
BUN SERPL-MCNC: 22 MG/DL — SIGNIFICANT CHANGE UP (ref 7–23)
CALCIUM SERPL-MCNC: 9.4 MG/DL — SIGNIFICANT CHANGE UP (ref 8.5–10.1)
CHLORIDE SERPL-SCNC: 107 MMOL/L — SIGNIFICANT CHANGE UP (ref 96–108)
CO2 SERPL-SCNC: 24 MMOL/L — SIGNIFICANT CHANGE UP (ref 22–31)
CREAT SERPL-MCNC: 1.3 MG/DL — SIGNIFICANT CHANGE UP (ref 0.5–1.3)
EGFR: 60 ML/MIN/1.73M2 — SIGNIFICANT CHANGE UP
EOSINOPHIL # BLD AUTO: 0.33 K/UL — SIGNIFICANT CHANGE UP (ref 0–0.5)
EOSINOPHIL NFR BLD AUTO: 3.5 % — SIGNIFICANT CHANGE UP (ref 0–6)
FOLATE SERPL-MCNC: >20 NG/ML — SIGNIFICANT CHANGE UP
GLUCOSE SERPL-MCNC: 100 MG/DL — HIGH (ref 70–99)
HCT VFR BLD CALC: 40.8 % — SIGNIFICANT CHANGE UP (ref 39–50)
HGB BLD-MCNC: 14.2 G/DL — SIGNIFICANT CHANGE UP (ref 13–17)
IMM GRANULOCYTES NFR BLD AUTO: 0.3 % — SIGNIFICANT CHANGE UP (ref 0–0.9)
LYMPHOCYTES # BLD AUTO: 1.8 K/UL — SIGNIFICANT CHANGE UP (ref 1–3.3)
LYMPHOCYTES # BLD AUTO: 19.3 % — SIGNIFICANT CHANGE UP (ref 13–44)
MCHC RBC-ENTMCNC: 31.4 PG — SIGNIFICANT CHANGE UP (ref 27–34)
MCHC RBC-ENTMCNC: 34.8 GM/DL — SIGNIFICANT CHANGE UP (ref 32–36)
MCV RBC AUTO: 90.3 FL — SIGNIFICANT CHANGE UP (ref 80–100)
MONOCYTES # BLD AUTO: 0.9 K/UL — SIGNIFICANT CHANGE UP (ref 0–0.9)
MONOCYTES NFR BLD AUTO: 9.6 % — SIGNIFICANT CHANGE UP (ref 2–14)
NEUTROPHILS # BLD AUTO: 6.23 K/UL — SIGNIFICANT CHANGE UP (ref 1.8–7.4)
NEUTROPHILS NFR BLD AUTO: 66.8 % — SIGNIFICANT CHANGE UP (ref 43–77)
NRBC # BLD: 0 /100 WBCS — SIGNIFICANT CHANGE UP (ref 0–0)
PLATELET # BLD AUTO: 247 K/UL — SIGNIFICANT CHANGE UP (ref 150–400)
POTASSIUM SERPL-MCNC: 3.5 MMOL/L — SIGNIFICANT CHANGE UP (ref 3.5–5.3)
POTASSIUM SERPL-SCNC: 3.5 MMOL/L — SIGNIFICANT CHANGE UP (ref 3.5–5.3)
PROT SERPL-MCNC: 6.8 G/DL — SIGNIFICANT CHANGE UP (ref 6–8.3)
PROT SERPL-MCNC: 6.8 G/DL — SIGNIFICANT CHANGE UP (ref 6–8.3)
RBC # BLD: 4.52 M/UL — SIGNIFICANT CHANGE UP (ref 4.2–5.8)
RBC # FLD: 13.5 % — SIGNIFICANT CHANGE UP (ref 10.3–14.5)
SODIUM SERPL-SCNC: 141 MMOL/L — SIGNIFICANT CHANGE UP (ref 135–145)
TSH SERPL-MCNC: 0.47 UIU/ML — SIGNIFICANT CHANGE UP (ref 0.36–3.74)
VIT B12 SERPL-MCNC: 690 PG/ML — SIGNIFICANT CHANGE UP (ref 232–1245)
WBC # BLD: 9.34 K/UL — SIGNIFICANT CHANGE UP (ref 3.8–10.5)
WBC # FLD AUTO: 9.34 K/UL — SIGNIFICANT CHANGE UP (ref 3.8–10.5)

## 2022-10-14 PROCEDURE — 99232 SBSQ HOSP IP/OBS MODERATE 35: CPT | Mod: GC

## 2022-10-14 PROCEDURE — 99233 SBSQ HOSP IP/OBS HIGH 50: CPT

## 2022-10-14 RX ORDER — GABAPENTIN 400 MG/1
1 CAPSULE ORAL
Qty: 0 | Refills: 0 | DISCHARGE
Start: 2022-10-14

## 2022-10-14 RX ORDER — GABAPENTIN 400 MG/1
300 CAPSULE ORAL AT BEDTIME
Refills: 0 | Status: DISCONTINUED | OUTPATIENT
Start: 2022-10-14 | End: 2022-10-15

## 2022-10-14 RX ADMIN — GABAPENTIN 300 MILLIGRAM(S): 400 CAPSULE ORAL at 22:19

## 2022-10-14 RX ADMIN — APIXABAN 5 MILLIGRAM(S): 2.5 TABLET, FILM COATED ORAL at 18:51

## 2022-10-14 RX ADMIN — Medication 250 MILLIGRAM(S): at 18:50

## 2022-10-14 RX ADMIN — APIXABAN 5 MILLIGRAM(S): 2.5 TABLET, FILM COATED ORAL at 05:01

## 2022-10-14 RX ADMIN — Medication 250 MILLIGRAM(S): at 05:01

## 2022-10-14 RX ADMIN — ATORVASTATIN CALCIUM 40 MILLIGRAM(S): 80 TABLET, FILM COATED ORAL at 21:09

## 2022-10-14 RX ADMIN — PIPERACILLIN AND TAZOBACTAM 25 GRAM(S): 4; .5 INJECTION, POWDER, LYOPHILIZED, FOR SOLUTION INTRAVENOUS at 05:01

## 2022-10-14 RX ADMIN — Medication 0.3 MILLIGRAM(S): at 21:09

## 2022-10-14 RX ADMIN — GABAPENTIN 300 MILLIGRAM(S): 400 CAPSULE ORAL at 05:02

## 2022-10-14 RX ADMIN — Medication 650 MILLIGRAM(S): at 21:09

## 2022-10-14 RX ADMIN — Medication 100 MILLIGRAM(S): at 05:01

## 2022-10-14 RX ADMIN — LOSARTAN POTASSIUM 100 MILLIGRAM(S): 100 TABLET, FILM COATED ORAL at 05:02

## 2022-10-14 RX ADMIN — PIPERACILLIN AND TAZOBACTAM 25 GRAM(S): 4; .5 INJECTION, POWDER, LYOPHILIZED, FOR SOLUTION INTRAVENOUS at 13:10

## 2022-10-14 RX ADMIN — Medication 650 MILLIGRAM(S): at 21:45

## 2022-10-14 RX ADMIN — Medication 25 MILLIGRAM(S): at 05:01

## 2022-10-14 RX ADMIN — Medication 1 TABLET(S): at 18:50

## 2022-10-14 RX ADMIN — Medication 81 MILLIGRAM(S): at 13:10

## 2022-10-14 RX ADMIN — Medication 1 TABLET(S): at 13:10

## 2022-10-14 NOTE — PROGRESS NOTE ADULT - PROBLEM SELECTOR PROBLEM 3
HTN (hypertension)
Chronic atrial fibrillation
HTN (hypertension)
HTN (hypertension)
Hypokalemia
Diarrhea

## 2022-10-14 NOTE — PROGRESS NOTE ADULT - PROBLEM SELECTOR PLAN 1
- Hx of chronic venous stasis, admitted with left lower leg wounds and LLE cellulitis  - Resolved and the wound is dry.  - Continue Zosyn 3.375g IV Q8h for today and transition to Augmentin 875/125 mg q12 starting tomorrow.  - encouraged pt to elevate LE  - ID Dr. Ny consulted, recs appreciated  - Wound care (Abdiel) consulted, recs appreciated  - PT consulted, recs appreciated - Hx of chronic venous stasis, admitted with left lower leg wounds and LLE cellulitis  - Resolved and the wound is dry.  - Continue Zosyn 3.375g IV Q8h for today and transition to Augmentin 875/125 mg q12 starting tomorrow.  - encouraged pt to elevate LE  - outpatient follow up at wound care center  - ID Dr. Ny consulted, recs appreciated  - Wound care (Abdiel) consulted, recs appreciated  - PT consulted, recs appreciated

## 2022-10-14 NOTE — PROGRESS NOTE ADULT - ATTENDING COMMENTS
Agree with above findings and plan
Pt. seen and evaluated for left LE cellulitis.  Pt. is in no distress.  Reports having some discomfort in left LE.  Tolerating IV antibiotics.  Physical examination as above.  Will continue IV Zosyn per ID.   Check blood cx.  Wound care consulted.
Pt. seen and evaluated for left LE cellulitis.  Pt. is in no distress.  Still reports having some burning pain in left LE, although improving.  Tolerating IV antibiotics.  Physical examination as above.  Will continue IV Zosyn per ID.  Awaiting Wound care consult.  Blood cultures NGTD.
67-year-old male with PMHx of A. fib on Eliquis, hypertension, hyperlipidemia, prostate cancer s/p prostatectomy, CABG x2 stents, hx of chronic venous stasis p/w left lower leg wounds with pain/erythema admitted for L LE cellulitis.      patient medically stable for discharge. changed gabapentin to qhs which patient states he takes at home. needs outpatient followup with neuro Dr. Briseno for EMG. d/w podiatry, no need for wound changes at home, can f/u with wound care center in 1 week for further wound care. d/w CM, outpatient PT script placed in chart.
67-year-old male with PMHx of A. fib on Eliquis, hypertension, hyperlipidemia, prostate cancer s/p prostatectomy, CABG x2 stents, hx of chronic venous stasis p/w left lower leg wounds with pain/erythema admitted for L LE cellulitis.      pt still with pain. increase gabapentin. pain appears neuropathic. MRI lumbar spine ordered by neurology, possibly neuropathic pain caused from lumbar spine. f/u neuro recs. plan for dc home tmrw with outpatient followup pending clinical progress. f/u podiatry recs for wound care.
see below

## 2022-10-14 NOTE — PROGRESS NOTE ADULT - PROBLEM SELECTOR PROBLEM 5
Chronic atrial fibrillation
HLD (hyperlipidemia)
HTN (hypertension)

## 2022-10-14 NOTE — PROGRESS NOTE ADULT - SUBJECTIVE AND OBJECTIVE BOX
Neurology Follow up note    DELIA [id number]    HPI:  Patient is 67-year-old male with PMHx of A. fib on Eliquis, hypertension, hyperlipidemia,  prostate cancer s/p prostatectomy, CABG x2 stents, hx of chronic venous stasis  here for left lower leg wounds.  Patient states has had wounds ( left leg ) flaccid blisters, for about 4 months  getting worse now. Reports was seen by vascular Dr Delia Barakat, 4 months ago for lower extremity wounds and chronic venous stasis, compartment pressure was fine per patient. Reports he works as  , able to ambulate fine, but recently notice numbness/ tingling and burning in the left leg. Pain is getting worse with movement sometimes.  Patient denies any trauma to the area and denies any fever or chills. Today got worsening burning pain and almost fell over and got concerned for DVT so came to emergency room.    Denies any chest pain/ pressure, palpitation, any recent falls, hematuria/ or blood in stool.    IN ED  VITALS: /98, HR 88, RR 18, temp 98.6F  on RA   PERTINENT LABS:  WBC 10.52, ESR 21, K 3.4, LACTATE 3.2   LOWER EXT Doppler u/s: NEGATIVE for DVT LE (09 Oct 2022 23:33)      Interval History -feeling tired    Patient is seen, chart was reviewed and case was discussed with the treatment team.  Pt is not in any distress.   Lying on bed comfortably.   No events reported overnight.       Vital Signs Last 24 Hrs  T(C): 36.9 (14 Oct 2022 11:32), Max: 36.9 (14 Oct 2022 11:32)  T(F): 98.4 (14 Oct 2022 11:32), Max: 98.4 (14 Oct 2022 11:32)  HR: 76 (14 Oct 2022 11:32) (69 - 76)  BP: 142/96 (14 Oct 2022 11:32) (142/96 - 170/97)  BP(mean): --  RR: 18 (14 Oct 2022 11:32) (18 - 18)  SpO2: 94% (14 Oct 2022 11:32) (94% - 97%)    Parameters below as of 14 Oct 2022 11:32  Patient On (Oxygen Delivery Method): room air            REVIEW OF SYSTEMS:    Constitutional: No fever, weight loss or fatigue  Eyes: No eye pain, visual disturbances, or discharge  ENT:  No difficulty hearing, tinnitus, vertigo; No sinus or throat pain  Neck: No pain or stiffness  Respiratory: No c, wheezing, chills or hemoptysis  Cardiovascular: No chest pain, palpitations, shortness of breath, dizziness   Gastrointestinal: No abdominal or epigastric pain. No nausea, vomiting or hematemesis;  Genitourinary: No dysuria, frequency, hematuria or incontinence  Neurological: No headaches, memory loss, loss of strength, numbness or tremors  Psychiatric: No depression, anxiety, mood swings or difficulty sleeping  Musculoskeletal: No joint pain or swelling; No muscle, back or extremity pain  Skin: No itching, burning, rashes or lesions   Lymph Nodes: No enlarged glands  Endocrine: No heat or cold intolerance;   Allergy and Immunologic: No hives or eczema    On Neurological Examination:    Mental Status - Pt is alert, awake, oriented X3.Follows commands well and able to answer questions appropriately.Mood and affect  normal    Speech -  Normal.    Cranial Nerves - Pupils 3 mm equal and reactive to light, extraocular eye movements intact. Pt has no visual field deficit.  Pt has no facial asymmetry. Facial sensation is intact.Tongue - is in midline.    Muscle tone - is normal      Motor Exam - 5/5 of UE  RLE 5/5  LLE 4+/5  , No drift. No shaking or tremors.    Sensory Exam - Pin prick, temperature, joint position and vibration are intact on either side. Pt withdraws all extremities equally on stimulation. No asymmetry seen.    Gait - Able to stand and walk with wide based gait    coordination:    Finger to nose: norm    Deep tendon Reflexes - 2 plus all over.        Romberg - positive    Neck Supple -  Yes.     MEDICATIONS    acetaminophen     Tablet .. 650 milliGRAM(s) Oral every 6 hours PRN  aluminum hydroxide/magnesium hydroxide/simethicone Suspension 30 milliLiter(s) Oral every 4 hours PRN  apixaban 5 milliGRAM(s) Oral every 12 hours  aspirin enteric coated 81 milliGRAM(s) Oral daily  atorvastatin 40 milliGRAM(s) Oral at bedtime  cloNIDine 0.3 milliGRAM(s) Oral at bedtime  gabapentin 300 milliGRAM(s) Oral at bedtime  hydrochlorothiazide 25 milliGRAM(s) Oral daily  influenza  Vaccine (HIGH DOSE) 0.7 milliLiter(s) IntraMuscular once  losartan 100 milliGRAM(s) Oral daily  melatonin 3 milliGRAM(s) Oral at bedtime PRN  metoprolol succinate  milliGRAM(s) Oral daily  multivitamin 1 Tablet(s) Oral daily  ondansetron Injectable 4 milliGRAM(s) IV Push every 8 hours PRN  piperacillin/tazobactam IVPB.. 3.375 Gram(s) IV Intermittent every 8 hours  saccharomyces boulardii 250 milliGRAM(s) Oral two times a day      Allergies    No Known Allergies    Intolerances        LABS:  CBC Full  -  ( 13 Oct 2022 06:54 )  WBC Count : 8.79 K/uL  RBC Count : 4.41 M/uL  Hemoglobin : 13.8 g/dL  Hematocrit : 40.3 %  Platelet Count - Automated : 240 K/uL  Mean Cell Volume : 91.4 fl  Mean Cell Hemoglobin : 31.3 pg  Mean Cell Hemoglobin Concentration : 34.2 gm/dL      10-14    141  |  107  |  22  ----------------------------<  100<H>  3.5   |  24  |  1.30    Ca    9.4      14 Oct 2022 07:55  Mg     2.3     10-13    TPro  6.8  /  Alb  x   /  TBili  x   /  DBili  x   /  AST  x   /  ALT  x   /  AlkPhos  x   10-14    Hemoglobin A1C:     Vitamin B12     RADIOLOGY  m< from: MR Lumbar Spine No Cont (10.13.22 @ 12:06) >    ACC: [id number] EXAM:  MR SPINE LUMBAR                          PROCEDURE DATE:  10/13/2022          INTERPRETATION:  Clinical indication: Unsteady gait.    MRI of the lumbar spine was performed using sagittal T1-T2 and STIR   sequence. Axial T1 and T2-weighted sequences were performed as well as   coronal T1-weighted sequence.    Reversal of the normal lumbar lordosis is seen.    Mild scoliosis seen.    There is a grade 1 anterolisthesis seen involving L5 and S1. There is   evidence of bilaterallysis defects suspected at this level. This finding   can be confirmed with plain film.    Disc desiccation is seen throughout the lower thoracic and lumbar spine   region. This is most prominent the L5-S1 level and is secondary to   chronic degenerative change    Schmorl's nodes are seen at multiple levels secondary to chronic   degenerative change    Mild compression deformity is seen involving the T11 vertebral body. No   associated areas of edema or abnormal signal seen to suggest underlying   pathologic process. No significant retropulsed fragment is seen.    T11-12: Normal.    T12-L1: Disc bulge and bilateral hypertrophic facet changes. Severe   narrowing of both neural foramen    L1-2: Bilateral hypertrophic facet joint changes are seen. Mild narrowing   of both neural foramen    L2-3: Bilateral hypertrophic facet changes. Moderate narrowing of the   right neural foramen. Mild narrowing of the spinal canal.    L3-4: Disc bulge and bilateral hypertrophic facet changes. Moderate   narrowing of the spinal canal. Mild narrowing of both neural foramen    L4-5: Disc bulge and bilateral hypertrophic facet joint changes. Mild   narrowing of the right neural foramen. Moderate narrowing of the left   neural foramen    L5-S1: Disc bulge and bilateral hypertrophic facet changes. Mild   narrowing of both neural foramen    The conus ends at L2 and appears normal    Evaluation of the paraspinal soft tissues appear normal.    IMPRESSION: Degenerative changes as described above.      MARTÍNEZ DE LA O MD; Attending Radiologist  This document has been electronically signed. Oct 13 2022 12:36PM    `< from: MR Head No Cont (10.12.22 @ 16:38) >    ACC: [id number] EXAM:  MR ANGIO BRAIN                        ACC: [id number] EXAM:  MR BRAIN                          PROCEDURE DATE:  10/12/2022          INTERPRETATION:  CLINICAL STATEMENT: Pain.    TECHNIQUE: MRI of the brain and MRA of the head were performed without   gadolinium.    COMPARISON: CT head 11/8/2007    FINDINGS:  MRI of the brain:  There is mild diffuse parenchymal volume loss.  There are T2 prolongation   signal abnormalities in the periventricular subcortical white matter   likely related to severe chronic microvascular ischemic changes.    There is no acute parenchymal hemorrhage, parenchymal mass, mass effect   or midline shift. There is no extra-axial fluid collection.  There is no   hydrocephalus.  There is no acute infarct.    Partial opacification mastoid air cells, right greater than left    MRA of the head:  The proximal anterior, middle and posterior cerebral arteries are patent   without hemodynamically significant stenosis.    The intracranial vertebral and basilar arteries are patent.    There is no intracranial aneurysm.    IMPRESSION:  No acute intracranial hemorrhage or acute infarct    No hemodynamically significant stenosis            APRIL GARCIA MD; Attending Radiologist  This document has been electronically signed. Oct 12 2022  5:19PM      ASSESSMENT AND PLAN:      seen for unsteady gait/hx of fall  likely multifactorial  including neuropathy causing ataxia  element of peripheral vestibulopathy  ?lumbar radiculopathy    neuropathic w/u in progress  EMG as out patient  neuro stand point stable for dc  Physical therapy evaluation.  OOB to chair/ambulation with assistance only.  Pain is accessed and addressed.  Would continue to follow.

## 2022-10-14 NOTE — PROGRESS NOTE ADULT - PROBLEM SELECTOR PLAN 2
- Patient reported spills of losing balance while sitting, found to have his body lean toward one side.  - Orthostatics negative.  - Neuro (Finn) consulted, recs appreciated.  - MRI, MRA head shows No acute intracranial hemorrhage or acute infarct, no hemodynamically significant stenosis.  - MRI lumbar spines showed some degenerative changes with no infectious process or acute changes.  - Folate and B12 levels are wnl.  - F/u lyme serology.  - F/u serum protein electrophoresis.

## 2022-10-14 NOTE — PROGRESS NOTE ADULT - SUBJECTIVE AND OBJECTIVE BOX
Patient is a 67y old  Male who presents with a chief complaint of LLE cellulitis (13 Oct 2022 23:47)      Subjective:  INTERVAL HPI/OVERNIGHT EVENTS: Patient seen and examined at bedside. No overnight events occurred. Patient has no complaints at this time. Denies fevers, chills, headache, lightheadedness, chest pain, dyspnea, abdominal pain, n/v/d/c.    MEDICATIONS  (STANDING):  apixaban 5 milliGRAM(s) Oral every 12 hours  aspirin enteric coated 81 milliGRAM(s) Oral daily  atorvastatin 40 milliGRAM(s) Oral at bedtime  cloNIDine 0.3 milliGRAM(s) Oral at bedtime  gabapentin 300 milliGRAM(s) Oral two times a day  hydrochlorothiazide 25 milliGRAM(s) Oral daily  influenza  Vaccine (HIGH DOSE) 0.7 milliLiter(s) IntraMuscular once  losartan 100 milliGRAM(s) Oral daily  metoprolol succinate  milliGRAM(s) Oral daily  multivitamin 1 Tablet(s) Oral daily  piperacillin/tazobactam IVPB.. 3.375 Gram(s) IV Intermittent every 8 hours  saccharomyces boulardii 250 milliGRAM(s) Oral two times a day    MEDICATIONS  (PRN):  acetaminophen     Tablet .. 650 milliGRAM(s) Oral every 6 hours PRN Temp greater or equal to 38C (100.4F), Mild Pain (1 - 3)  aluminum hydroxide/magnesium hydroxide/simethicone Suspension 30 milliLiter(s) Oral every 4 hours PRN Dyspepsia  melatonin 3 milliGRAM(s) Oral at bedtime PRN Insomnia  ondansetron Injectable 4 milliGRAM(s) IV Push every 8 hours PRN Nausea and/or Vomiting      Allergies    No Known Allergies    Intolerances        REVIEW OF SYSTEMS:  CONSTITUTIONAL: No fever or chills  HEENT:  No headache, no sore throat  RESPIRATORY: No cough, wheezing, or shortness of breath  CARDIOVASCULAR: No chest pain, palpitations  GASTROINTESTINAL: No abd pain, nausea, vomiting, or diarrhea  GENITOURINARY: No dysuria, frequency, or hematuria  NEUROLOGICAL: no focal weakness or dizziness  MUSCULOSKELETAL: no myalgias     Objective:  Vital Signs Last 24 Hrs  T(C): 36.3 (14 Oct 2022 04:51), Max: 36.8 (13 Oct 2022 20:27)  T(F): 97.4 (14 Oct 2022 04:51), Max: 98.3 (13 Oct 2022 20:27)  HR: 69 (14 Oct 2022 04:51) (69 - 74)  BP: 158/87 (14 Oct 2022 04:51) (147/89 - 170/97)  BP(mean): --  RR: 18 (14 Oct 2022 04:51) (18 - 18)  SpO2: 95% (14 Oct 2022 04:51) (94% - 97%)    Parameters below as of 14 Oct 2022 04:51  Patient On (Oxygen Delivery Method): room air        GENERAL: NAD, lying in bed comfortably  HEAD:  Atraumatic, Normocephalic  EYES: EOMI, PERRLA, conjunctiva and sclera clear  ENT: Moist mucous membranes  NECK: Supple, No JVD  CHEST/LUNG: Clear to auscultation bilaterally; No rales, rhonchi, wheezing, or rubs. Unlabored respirations  HEART: Regular rate and rhythm; No murmurs, rubs, or gallops  ABDOMEN: Bowel sounds present; Soft, Nontender, Nondistended. No hepatomegaly  EXTREMITIES:  2+ Peripheral Pulses, brisk capillary refill. No clubbing, cyanosis, or edema  NERVOUS SYSTEM:  Alert & Oriented X3, speech clear. No deficits   MSK: FROM all 4 extremities, full and equal strength  SKIN: No rashes or lesions    LABS:    CBC Full  -  ( 13 Oct 2022 06:54 )  WBC Count : 8.79 K/uL  Hemoglobin : 13.8 g/dL  Hematocrit : 40.3 %  Platelet Count - Automated : 240 K/uL  Mean Cell Volume : 91.4 fl  Mean Cell Hemoglobin : 31.3 pg  Mean Cell Hemoglobin Concentration : 34.2 gm/dL  Auto Neutrophil # : x  Auto Lymphocyte # : x  Auto Monocyte # : x  Auto Eosinophil # : x  Auto Basophil # : x  Auto Neutrophil % : x  Auto Lymphocyte % : x  Auto Monocyte % : x  Auto Eosinophil % : x  Auto Basophil % : x    14 Oct 2022 07:55    141    |  107    |  22     ----------------------------<  100    3.5     |  24     |  1.30     Ca    9.4        14 Oct 2022 07:55          CAPILLARY BLOOD GLUCOSE            Culture - Blood (collected 10-09-22 @ 19:22)  Source: .Blood Blood-Peripheral  Preliminary Report (10-11-22 @ 02:01):    No growth to date.    Culture - Blood (collected 10-09-22 @ 19:17)  Source: .Blood Blood-Peripheral  Preliminary Report (10-11-22 @ 02:01):    No growth to date.        RADIOLOGY & ADDITIONAL TESTS:    Personally reviewed.     Consultant(s) Notes Reviewed:  [x] YES  [ ] NO     Patient is a 67y old  Male who presents with a chief complaint of LLE cellulitis (13 Oct 2022 23:47)      Subjective:  INTERVAL HPI/OVERNIGHT EVENTS: Patient seen and examined at bedside. No overnight events occurred. Patient has mild burning pain and tingling in the left leg which is improving. Denies fevers, chills, headache, lightheadedness, chest pain, dyspnea, abdominal pain, n/v/d/c.    MEDICATIONS  (STANDING):  apixaban 5 milliGRAM(s) Oral every 12 hours  aspirin enteric coated 81 milliGRAM(s) Oral daily  atorvastatin 40 milliGRAM(s) Oral at bedtime  cloNIDine 0.3 milliGRAM(s) Oral at bedtime  gabapentin 300 milliGRAM(s) Oral two times a day  hydrochlorothiazide 25 milliGRAM(s) Oral daily  influenza  Vaccine (HIGH DOSE) 0.7 milliLiter(s) IntraMuscular once  losartan 100 milliGRAM(s) Oral daily  metoprolol succinate  milliGRAM(s) Oral daily  multivitamin 1 Tablet(s) Oral daily  piperacillin/tazobactam IVPB.. 3.375 Gram(s) IV Intermittent every 8 hours  saccharomyces boulardii 250 milliGRAM(s) Oral two times a day    MEDICATIONS  (PRN):  acetaminophen     Tablet .. 650 milliGRAM(s) Oral every 6 hours PRN Temp greater or equal to 38C (100.4F), Mild Pain (1 - 3)  aluminum hydroxide/magnesium hydroxide/simethicone Suspension 30 milliLiter(s) Oral every 4 hours PRN Dyspepsia  melatonin 3 milliGRAM(s) Oral at bedtime PRN Insomnia  ondansetron Injectable 4 milliGRAM(s) IV Push every 8 hours PRN Nausea and/or Vomiting      Allergies    No Known Allergies    Intolerances        REVIEW OF SYSTEMS:  CONSTITUTIONAL: No fever or chills  HEENT:  No headache, no sore throat  RESPIRATORY: No cough, wheezing, or shortness of breath  CARDIOVASCULAR: No chest pain, palpitations  GASTROINTESTINAL: No abd pain, nausea, vomiting, or diarrhea  GENITOURINARY: No dysuria, frequency, or hematuria  NEUROLOGICAL: no focal weakness or dizziness  MUSCULOSKELETAL: Mild burning/ neuropathic pain in the left lower leg     Objective:  Vital Signs Last 24 Hrs  T(C): 36.3 (14 Oct 2022 04:51), Max: 36.8 (13 Oct 2022 20:27)  T(F): 97.4 (14 Oct 2022 04:51), Max: 98.3 (13 Oct 2022 20:27)  HR: 69 (14 Oct 2022 04:51) (69 - 74)  BP: 158/87 (14 Oct 2022 04:51) (147/89 - 170/97)  BP(mean): --  RR: 18 (14 Oct 2022 04:51) (18 - 18)  SpO2: 95% (14 Oct 2022 04:51) (94% - 97%)    Parameters below as of 14 Oct 2022 04:51  Patient On (Oxygen Delivery Method): room air        GENERAL: NAD, lying in bed comfortably  HEAD:  Atraumatic, Normocephalic  EYES: EOMI, PERRLA, conjunctiva and sclera clear  ENT: Moist mucous membranes  NECK: Supple, No JVD  CHEST/LUNG: Clear to auscultation bilaterally; No rales, rhonchi, wheezing, or rubs. Unlabored respirations  HEART: Irregular rhythm; No murmurs, rubs, or gallops  ABDOMEN: Bowel sounds present; Soft, Nontender, Nondistended. No hepatomegaly  EXTREMITIES:  2+ Peripheral Pulses, brisk capillary refill. Chronic venous stasis changes in LE B/l, left leg wound is covered with a dressing which is D/C/I. No clubbing, cyanosis, or edema.  NERVOUS SYSTEM:  Alert & Oriented X3, speech clear. No deficits   MSK: FROM all 4 extremities, full and equal strength  SKIN: No rashes or lesions    LABS:    CBC Full  -  ( 13 Oct 2022 06:54 )  WBC Count : 8.79 K/uL  Hemoglobin : 13.8 g/dL  Hematocrit : 40.3 %  Platelet Count - Automated : 240 K/uL  Mean Cell Volume : 91.4 fl  Mean Cell Hemoglobin : 31.3 pg  Mean Cell Hemoglobin Concentration : 34.2 gm/dL  Auto Neutrophil # : x  Auto Lymphocyte # : x  Auto Monocyte # : x  Auto Eosinophil # : x  Auto Basophil # : x  Auto Neutrophil % : x  Auto Lymphocyte % : x  Auto Monocyte % : x  Auto Eosinophil % : x  Auto Basophil % : x    14 Oct 2022 07:55    141    |  107    |  22     ----------------------------<  100    3.5     |  24     |  1.30     Ca    9.4        14 Oct 2022 07:55          CAPILLARY BLOOD GLUCOSE            Culture - Blood (collected 10-09-22 @ 19:22)  Source: .Blood Blood-Peripheral  Preliminary Report (10-11-22 @ 02:01):    No growth to date.    Culture - Blood (collected 10-09-22 @ 19:17)  Source: .Blood Blood-Peripheral  Preliminary Report (10-11-22 @ 02:01):    No growth to date.        RADIOLOGY & ADDITIONAL TESTS:    Personally reviewed.     Consultant(s) Notes Reviewed:  [x] YES  [ ] NO

## 2022-10-14 NOTE — PROGRESS NOTE ADULT - PROBLEM SELECTOR PROBLEM 6
HLD (hyperlipidemia)
Need for prophylactic measure
Need for prophylactic measure
Chronic atrial fibrillation
Need for prophylactic measure

## 2022-10-14 NOTE — PROGRESS NOTE ADULT - PROBLEM SELECTOR PLAN 6
Eliquis 5mg BID
continue home atorvastatin 40mg daily
Eliquis 5mg BID
VTE ppx: c/w Eliquis 5mg BID
- Chronic, pt with known history of a fib on Eliquis   - Continue home Eliquis and rate control lopressor 100mg daily with hold parameter    - Continue to monitor electrolytes.

## 2022-10-14 NOTE — PROGRESS NOTE ADULT - REASON FOR ADMISSION
LLE cellulitis

## 2022-10-14 NOTE — PROGRESS NOTE ADULT - PROBLEM SELECTOR PLAN 5
continue home atorvastatin 40mg daily
continue home atorvastatin 40mg daily
- chronic  - at home BP meds, losartan/HCTZ 100mg-25mg daily , clonidine 0.3mg daily, Toprol XL 100mg daily continue with hold parameters   - DASH/TLC  - monitor BP & titrate BP meds PRN.
continue home atorvastatin 40mg daily
- Chronic, pt with known history of a fib on Eliquis   - Continue home Eliquis and rate control lopressor 100mg daily with hold parameter    - Continue to monitor electrolytes.

## 2022-10-14 NOTE — PROGRESS NOTE ADULT - PROBLEM SELECTOR PLAN 3
- chronic  - at home BP meds, losartan/HCTZ 100mg-25mg daily , clonidine 0.3mg daily, Toprol XL 100mg daily continue with hold parameters   - DASH/TLC  - monitor BP & titrate BP meds PRN.

## 2022-10-14 NOTE — PROGRESS NOTE ADULT - SUBJECTIVE AND OBJECTIVE BOX
[facility]  INFECTIOUS DISEASES   [address] [place] [address]  Tel: [phone]     Fax: [phone]  ======================================================  MD Wilmer Boston Kaushal, MD Cho, Michelle, MD   ======================================================    [id number]  DELIA AMIN     Follow up: LLE cellulitis     No fever. No pain. Swelling, erythema, warmth and all better.     PAST MEDICAL & SURGICAL HISTORY:  Atrial fibrillation, unspecified type  Hypertension  HLD (hyperlipidemia)  Prostate cancer  H/O prostatectomy  Presence of stent in artery    Social Hx: no smoking, ETOH Or drugs     FAMILY HISTORY:  No pertinent family history in first degree relatives    Allergies  No Known Allergies    Antibiotics:  MEDICATIONS  (STANDING):  apixaban 5 milliGRAM(s) Oral every 12 hours  aspirin enteric coated 81 milliGRAM(s) Oral daily  atorvastatin 40 milliGRAM(s) Oral at bedtime  cloNIDine 0.3 milliGRAM(s) Oral at bedtime  gabapentin 100 milliGRAM(s) Oral every 8 hours  hydrochlorothiazide 25 milliGRAM(s) Oral daily  losartan 100 milliGRAM(s) Oral daily  multivitamin 1 Tablet(s) Oral daily  piperacillin/tazobactam IVPB.. 3.375 Gram(s) IV Intermittent every 8 hours  vancomycin  IVPB 1250 milliGRAM(s) IV Intermittent every 12 hours     REVIEW OF SYSTEMS:  CONSTITUTIONAL:  No Fever or chills  HEENT:  No diplopia or blurred vision.  No sore throat or runny nose.  CARDIOVASCULAR:  No chest pain or SOB.  RESPIRATORY:  No cough, shortness of breath, PND or orthopnea.  GASTROINTESTINAL:  No nausea, vomiting or diarrhea.  GENITOURINARY:  No dysuria, frequency or urgency. No Blood in urine  MUSCULOSKELETAL:  leg swelling and ulcers   SKIN:  leg ulcers   NEUROLOGIC:  No paresthesias, fasciculations, seizures or weakness.  PSYCHIATRIC:  No disorder of thought or mood.  ENDOCRINE:  No heat or cold intolerance, polyuria or polydipsia.  HEMATOLOGICAL:  No easy bruising or bleeding.     Physical Exam:  Vital Signs Last 24 Hrs  T(C): 36.9 (14 Oct 2022 11:32), Max: 36.9 (14 Oct 2022 11:32)  T(F): 98.4 (14 Oct 2022 11:32), Max: 98.4 (14 Oct 2022 11:32)  HR: 76 (14 Oct 2022 11:32) (69 - 76)  BP: 142/96 (14 Oct 2022 11:32) (142/96 - 158/87)  BP(mean): --  RR: 18 (14 Oct 2022 11:32) (18 - 18)  SpO2: 94% (14 Oct 2022 11:32) (94% - 95%)  Parameters below as of 14 Oct 2022 11:32  Patient On (Oxygen Delivery Method): room air  GEN: NAD, obese   HEENT: normocephalic and atraumatic. EOMI. PERRL.    NECK: Supple.  No lymphadenopathy   LUNGS: Clear to auscultation.  HEART: Regular rate and rhythm without murmur.  ABDOMEN: Soft, nontender, and nondistended.  Positive bowel sounds.    : No CVA tenderness  EXTREMITIES: Chronic skin changes in both lower legs  Left leg with multiple dry ulcers, below knee leg is swollen with warmth  erythema and tenderness, no purulent discharge or fluctuation.    NEUROLOGIC: grossly intact.  PSYCHIATRIC: Appropriate affect .  SKIN: No rash, legs as above     Labs:                        14.2   9.34  )-----------( 247      ( 14 Oct 2022 07:55 )             40.8     10-14    141  |  107  |  22  ----------------------------<  100<H>  3.5   |  24  |  1.30    Ca    9.4      14 Oct 2022 07:55  Mg     2.3     10-13    TPro  6.8  /  Alb  x   /  TBili  x   /  DBili  x   /  AST  x   /  ALT  x   /  AlkPhos  x   10-14    Culture - Blood (collected 10-09-22 @ 19:22)  Source: .Blood Blood-Peripheral    Culture - Blood (collected 10-09-22 @ 19:17)  Source: .Blood Blood-Peripheral    WBC Count: 9.34 K/uL (10-14-22 @ 07:55)  WBC Count: 8.79 K/uL (10-13-22 @ 06:54)  WBC Count: 9.58 K/uL (10-12-22 @ 08:20)  WBC Count: 8.76 K/uL (10-11-22 @ 05:44)  WBC Count: 9.17 K/uL (10-10-22 @ 09:37)  WBC Count: 10.52 K/uL (10-09-22 @ 19:17)    Creatinine, Serum: 1.30 mg/dL (10-14-22 @ 07:55)  Creatinine, Serum: 1.30 mg/dL (10-13-22 @ 06:54)  Creatinine, Serum: 1.20 mg/dL (10-12-22 @ 08:20)  Creatinine, Serum: 1.20 mg/dL (10-11-22 @ 05:44)  Creatinine, Serum: 1.30 mg/dL (10-10-22 @ 09:37)  Creatinine, Serum: 1.20 mg/dL (10-09-22 @ 19:17)    C-Reactive Protein, Serum: 6 mg/L (10-09-22 @ 19:17)    Sedimentation Rate, Erythrocyte: 21 mm/hr (10-09-22 @ 19:17)    COVID-19 PCR: NotDetec (10-09-22 @ 23:09)    All imaging and other data have been reviewed.  < from: US Duplex Venous Lower Ext Ltd, Left (10.09.22 @ 20:31) >  IMPRESSION:  No evidence of left lower extremity deep venous thrombosis.    < from: Xray Tibia + Fibula 2 Views, Left (10.09.22 @ 20:40) >  IMPRESSION:   No acute radiographic osseous pathology..    Assessment and Plan:   68yo man with PMH of HTN, CAD s/p CABG, A. fib, prostate cancer s/p prostatectomy, and chronic venous stasis was admitted with leg ulcers and cellulitis.   He had blisters and swelling in legs that opened up causing ulcers that is going on for about 6months. Seen vascular doctor in the past stating that had good arterial circulation.   Feels burning pain in both legs. No fever or chills. Not seen in wound center.      Recommendations:  - Blood cultures NGTD  - MRSA PCR negative   - Vascular consult and wound care consults noted   - On zosyn 3.375gm q8  - Switched to oral Augmentin 875mg q12 for one week   - follow up in wound center.     Will sign off please call with any question.     Lupe Ny MD  Division of Infectious Diseases   Please call ID service at [phone] with any question.      35 minutes spent on total encounter assessing patient, examination, chart review, counseling and coordinating care by the attending physician/nurse/care manager.

## 2022-10-14 NOTE — PROGRESS NOTE ADULT - PROBLEM SELECTOR PROBLEM 4
Chronic atrial fibrillation
Diarrhea
Chronic atrial fibrillation
Chronic atrial fibrillation
HTN (hypertension)

## 2022-10-15 ENCOUNTER — TRANSCRIPTION ENCOUNTER (OUTPATIENT)
Age: 67
End: 2022-10-15

## 2022-10-15 VITALS
HEART RATE: 74 BPM | DIASTOLIC BLOOD PRESSURE: 98 MMHG | RESPIRATION RATE: 18 BRPM | SYSTOLIC BLOOD PRESSURE: 155 MMHG | TEMPERATURE: 98 F | OXYGEN SATURATION: 95 %

## 2022-10-15 LAB
CULTURE RESULTS: SIGNIFICANT CHANGE UP
CULTURE RESULTS: SIGNIFICANT CHANGE UP
SPECIMEN SOURCE: SIGNIFICANT CHANGE UP
SPECIMEN SOURCE: SIGNIFICANT CHANGE UP

## 2022-10-15 PROCEDURE — 99239 HOSP IP/OBS DSCHRG MGMT >30: CPT | Mod: GC

## 2022-10-15 RX ORDER — POTASSIUM CHLORIDE 20 MEQ
40 PACKET (EA) ORAL ONCE
Refills: 0 | Status: COMPLETED | OUTPATIENT
Start: 2022-10-15 | End: 2022-10-15

## 2022-10-15 RX ORDER — SACCHAROMYCES BOULARDII 250 MG
1 POWDER IN PACKET (EA) ORAL
Qty: 14 | Refills: 0
Start: 2022-10-15 | End: 2022-10-21

## 2022-10-15 RX ADMIN — Medication 40 MILLIEQUIVALENT(S): at 11:34

## 2022-10-15 RX ADMIN — Medication 1 TABLET(S): at 11:34

## 2022-10-15 RX ADMIN — LOSARTAN POTASSIUM 100 MILLIGRAM(S): 100 TABLET, FILM COATED ORAL at 05:56

## 2022-10-15 RX ADMIN — Medication 100 MILLIGRAM(S): at 05:54

## 2022-10-15 RX ADMIN — Medication 81 MILLIGRAM(S): at 11:34

## 2022-10-15 RX ADMIN — Medication 250 MILLIGRAM(S): at 05:54

## 2022-10-15 RX ADMIN — Medication 1 TABLET(S): at 05:55

## 2022-10-15 RX ADMIN — Medication 25 MILLIGRAM(S): at 05:55

## 2022-10-15 RX ADMIN — APIXABAN 5 MILLIGRAM(S): 2.5 TABLET, FILM COATED ORAL at 05:54

## 2022-10-15 NOTE — DISCHARGE NOTE NURSING/CASE MANAGEMENT/SOCIAL WORK - PATIENT PORTAL LINK FT
You can access the FollowMyHealth Patient Portal offered by [facility] by registering at the following website: [email or web address]. By joining [employer]’s FollowMyHealth portal, you will also be able to view your health information using other applications (apps) compatible with our system.

## 2022-10-15 NOTE — DISCHARGE NOTE NURSING/CASE MANAGEMENT/SOCIAL WORK - NSDCPEFALRISK_GEN_ALL_CORE
For information on Fall & Injury Prevention, visit: [email or web address] OR  [email or web address] OR  https://www.cdc.gov/steadi/patient.html

## 2022-10-17 ENCOUNTER — RX RENEWAL (OUTPATIENT)
Age: 67
End: 2022-10-17

## 2022-10-19 ENCOUNTER — RX RENEWAL (OUTPATIENT)
Age: 67
End: 2022-10-19

## 2022-10-24 ENCOUNTER — NON-APPOINTMENT (OUTPATIENT)
Age: 67
End: 2022-10-24

## 2022-10-24 LAB
% ALBUMIN: 54.7 % — SIGNIFICANT CHANGE UP
% ALPHA 1: 4.5 % — SIGNIFICANT CHANGE UP
% ALPHA 2: 10.2 % — SIGNIFICANT CHANGE UP
% BETA: 12.6 % — SIGNIFICANT CHANGE UP
% GAMMA: 18 % — SIGNIFICANT CHANGE UP
% M SPIKE: SIGNIFICANT CHANGE UP
ALBUMIN SERPL ELPH-MCNC: 3.7 G/DL — SIGNIFICANT CHANGE UP (ref 3.6–5.5)
ALBUMIN/GLOB SERPL ELPH: 1.2 RATIO — SIGNIFICANT CHANGE UP
ALPHA1 GLOB SERPL ELPH-MCNC: 0.3 G/DL — SIGNIFICANT CHANGE UP (ref 0.1–0.4)
ALPHA2 GLOB SERPL ELPH-MCNC: 0.7 G/DL — SIGNIFICANT CHANGE UP (ref 0.5–1)
B-GLOBULIN SERPL ELPH-MCNC: 0.9 G/DL — SIGNIFICANT CHANGE UP (ref 0.5–1)
GAMMA GLOBULIN: 1.2 G/DL — SIGNIFICANT CHANGE UP (ref 0.6–1.6)
M-SPIKE: SIGNIFICANT CHANGE UP (ref 0–0)
PROT PATTERN SERPL ELPH-IMP: SIGNIFICANT CHANGE UP

## 2022-10-25 ENCOUNTER — APPOINTMENT (OUTPATIENT)
Dept: WOUND CARE | Facility: HOSPITAL | Age: 67
End: 2022-10-25

## 2022-10-25 ENCOUNTER — OUTPATIENT (OUTPATIENT)
Dept: OUTPATIENT SERVICES | Facility: HOSPITAL | Age: 67
LOS: 1 days | Discharge: HOME | End: 2022-10-25
Payer: MEDICARE

## 2022-10-25 VITALS
DIASTOLIC BLOOD PRESSURE: 116 MMHG | WEIGHT: 218 LBS | SYSTOLIC BLOOD PRESSURE: 190 MMHG | OXYGEN SATURATION: 98 % | BODY MASS INDEX: 31.21 KG/M2 | HEIGHT: 70 IN | RESPIRATION RATE: 20 BRPM | HEART RATE: 82 BPM | TEMPERATURE: 99.3 F

## 2022-10-25 VITALS — DIASTOLIC BLOOD PRESSURE: 113 MMHG | SYSTOLIC BLOOD PRESSURE: 171 MMHG

## 2022-10-25 DIAGNOSIS — I10 ESSENTIAL (PRIMARY) HYPERTENSION: ICD-10-CM

## 2022-10-25 DIAGNOSIS — R60.0 VENOUS INSUFFICIENCY (CHRONIC) (PERIPHERAL): ICD-10-CM

## 2022-10-25 DIAGNOSIS — I87.2 VENOUS INSUFFICIENCY (CHRONIC) (PERIPHERAL): ICD-10-CM

## 2022-10-25 DIAGNOSIS — L97.801 NON-PRESSURE CHRONIC ULCER OF OTHER PART OF UNSPECIFIED LOWER LEG LIMITED TO BREAKDOWN OF SKIN: ICD-10-CM

## 2022-10-25 DIAGNOSIS — Z90.79 ACQUIRED ABSENCE OF OTHER GENITAL ORGAN(S): Chronic | ICD-10-CM

## 2022-10-25 DIAGNOSIS — I83.893 VARICOSE VEINS OF BILATERAL LOWER EXTREMITIES WITH OTHER COMPLICATIONS: ICD-10-CM

## 2022-10-25 DIAGNOSIS — Z95.828 PRESENCE OF OTHER VASCULAR IMPLANTS AND GRAFTS: Chronic | ICD-10-CM

## 2022-10-25 PROCEDURE — G0463: CPT

## 2022-10-25 PROCEDURE — 99203 OFFICE O/P NEW LOW 30 MIN: CPT

## 2022-10-25 NOTE — PHYSICAL EXAM
[Normal Thyroid] : the thyroid was normal [Normal Breath Sounds] : Normal breath sounds [Normal Heart Sounds] : normal heart sounds [Normal Rate and Rhythm] : normal rate and rhythm [0] : left 0 [Ankle Swelling (On Exam)] : present [Ankle Swelling Bilaterally] : bilaterally  [Ankle Swelling On The Left] : moderate [Alert] : alert [Oriented to Person] : oriented to person [Oriented to Place] : oriented to place [Oriented to Time] : oriented to time [Calm] : calm [JVD] : no jugular venous distention  [] : not present [Abdomen Masses] : No abdominal massess [Abdomen Tenderness] : ~T ~M No abdominal tenderness [Tender] : nontender [Enlarged] : not enlarged [de-identified] : adult WM, NAD, alert, Ox3. [de-identified] : Patient expressed comfort post compression application [FreeTextEntry1] : Anterior leg - fragile epithelium  [de-identified] : No neurovascular deficits noted [de-identified] : Dry protective dressing  [de-identified] : Mechanically cleansed with 0.9%normal saline and sterile gauze\par  [TWNoteComboBox1] : Left [TWNoteComboBox4] : None [TWNoteComboBox5] : No [TWNoteComboBox6] : Traumatic [de-identified] : No [de-identified] : Normal [de-identified] : None [de-identified] : None [de-identified] : 100% [de-identified] : No [de-identified] : Ace wraps [de-identified] : 3x Weekly [de-identified] : Primary Dressing

## 2022-10-25 NOTE — ASSESSMENT
[Stable] : stable [Home] : Home [Not Applicable - Long Term Care/Home Health Agency] : Long Term Care/Home Health Agency: Not Applicable [Verbal] : Verbal [Written] : Written [Demo] : Demo [Patient] : Patient [Good - alert, interested, motivated] : Good - alert, interested, motivated [Verbalizes knowledge/Understanding] : Verbalizes knowledge/understanding [] : No [FreeTextEntry3] : Initial visit  [FreeTextEntry2] : Maintain Skin Integrity\par Vascular consult\par Vascular studies\par Cardiology consult\par F/U 1 week \par F/U  [FreeTextEntry4] : Patient reported to [facility] hypertensive, 190/116, 171/113 and 175/110.\mary alice HUGO recommended patient follow up with his cardiologist to have his BP meds adjusted. Patient was educated on the dangers of sustained elevated blood pressures such as stroke, kidney failure and heart attack.\par Patient has an appointment with his neurologist this week\mary alice HUGO ordered a vascualr consult and vascular studies\mary alice Authorization submitted and request submitted\mary alice F/U 1 week

## 2022-10-25 NOTE — VITALS
[Pain related to present condition?] : The patient's  pain is related to present condition. [] : No [de-identified] : 6/10 [FreeTextEntry3] : Left leg  [FreeTextEntry1] : Tylenol [FreeTextEntry2] : No

## 2022-10-25 NOTE — PLAN
[FreeTextEntry1] : LLE-DD, ace\par RLE-ace \par vascular study then vascular consult\par f/u 1 wk\par \par time spent 35 mins.

## 2022-10-25 NOTE — HISTORY OF PRESENT ILLNESS
[FreeTextEntry1] : 66 yo WM, here as a new patient/evaluation. Pt with a h/o bilateral BE edema, venous insufficiency, and recent LLE VSU. Pt was hospitalized last wk for LLE cellulitis. Is being followed by vascular surgeon, Dr. Reynoso who performed art .studies on him back in 4/22, but has not seen him since.\par    Pt also being followed by a cardiologist for refractory HTN and is on HTN meds. On today's visit, BP again noted to be elevated to first 190/116 and on repeat 175/110. I emphasized the importance of keeping his BP under better control to prevent increase risks of MI, CVA, CVT, or even death. I highly advised pt to see his cardiologist this wk for further evaluation.

## 2022-10-27 DIAGNOSIS — Z85.46 PERSONAL HISTORY OF MALIGNANT NEOPLASM OF PROSTATE: ICD-10-CM

## 2022-10-27 DIAGNOSIS — Z79.01 LONG TERM (CURRENT) USE OF ANTICOAGULANTS: ICD-10-CM

## 2022-10-27 DIAGNOSIS — Z87.442 PERSONAL HISTORY OF URINARY CALCULI: ICD-10-CM

## 2022-10-27 DIAGNOSIS — Z79.899 OTHER LONG TERM (CURRENT) DRUG THERAPY: ICD-10-CM

## 2022-10-27 DIAGNOSIS — Z98.890 OTHER SPECIFIED POSTPROCEDURAL STATES: ICD-10-CM

## 2022-10-27 DIAGNOSIS — I10 ESSENTIAL (PRIMARY) HYPERTENSION: ICD-10-CM

## 2022-10-27 DIAGNOSIS — I83.893 VARICOSE VEINS OF BILATERAL LOWER EXTREMITIES WITH OTHER COMPLICATIONS: ICD-10-CM

## 2022-10-27 DIAGNOSIS — N39.46 MIXED INCONTINENCE: ICD-10-CM

## 2022-10-27 DIAGNOSIS — Z90.79 ACQUIRED ABSENCE OF OTHER GENITAL ORGAN(S): ICD-10-CM

## 2022-10-27 DIAGNOSIS — I48.91 UNSPECIFIED ATRIAL FIBRILLATION: ICD-10-CM

## 2022-11-08 PROCEDURE — 73590 X-RAY EXAM OF LOWER LEG: CPT

## 2022-11-08 PROCEDURE — 97530 THERAPEUTIC ACTIVITIES: CPT

## 2022-11-08 PROCEDURE — 84443 ASSAY THYROID STIM HORMONE: CPT

## 2022-11-08 PROCEDURE — 86618 LYME DISEASE ANTIBODY: CPT

## 2022-11-08 PROCEDURE — 82607 VITAMIN B-12: CPT

## 2022-11-08 PROCEDURE — 87641 MR-STAPH DNA AMP PROBE: CPT

## 2022-11-08 PROCEDURE — 80061 LIPID PANEL: CPT

## 2022-11-08 PROCEDURE — 36415 COLL VENOUS BLD VENIPUNCTURE: CPT

## 2022-11-08 PROCEDURE — 86803 HEPATITIS C AB TEST: CPT

## 2022-11-08 PROCEDURE — 93971 EXTREMITY STUDY: CPT

## 2022-11-08 PROCEDURE — 80048 BASIC METABOLIC PNL TOTAL CA: CPT

## 2022-11-08 PROCEDURE — 99285 EMERGENCY DEPT VISIT HI MDM: CPT | Mod: 25

## 2022-11-08 PROCEDURE — 87640 STAPH A DNA AMP PROBE: CPT

## 2022-11-08 PROCEDURE — 83735 ASSAY OF MAGNESIUM: CPT

## 2022-11-08 PROCEDURE — 87040 BLOOD CULTURE FOR BACTERIA: CPT

## 2022-11-08 PROCEDURE — 84165 PROTEIN E-PHORESIS SERUM: CPT

## 2022-11-08 PROCEDURE — 72148 MRI LUMBAR SPINE W/O DYE: CPT

## 2022-11-08 PROCEDURE — 70544 MR ANGIOGRAPHY HEAD W/O DYE: CPT

## 2022-11-08 PROCEDURE — 80053 COMPREHEN METABOLIC PANEL: CPT

## 2022-11-08 PROCEDURE — 93005 ELECTROCARDIOGRAM TRACING: CPT

## 2022-11-08 PROCEDURE — 70551 MRI BRAIN STEM W/O DYE: CPT

## 2022-11-08 PROCEDURE — 83605 ASSAY OF LACTIC ACID: CPT

## 2022-11-08 PROCEDURE — 83036 HEMOGLOBIN GLYCOSYLATED A1C: CPT

## 2022-11-08 PROCEDURE — 85027 COMPLETE CBC AUTOMATED: CPT

## 2022-11-08 PROCEDURE — 87635 SARS-COV-2 COVID-19 AMP PRB: CPT

## 2022-11-08 PROCEDURE — 86140 C-REACTIVE PROTEIN: CPT

## 2022-11-08 PROCEDURE — 97116 GAIT TRAINING THERAPY: CPT

## 2022-11-08 PROCEDURE — 85025 COMPLETE CBC W/AUTO DIFF WBC: CPT

## 2022-11-08 PROCEDURE — 82746 ASSAY OF FOLIC ACID SERUM: CPT

## 2022-11-08 PROCEDURE — 97161 PT EVAL LOW COMPLEX 20 MIN: CPT

## 2022-11-08 PROCEDURE — 84155 ASSAY OF PROTEIN SERUM: CPT

## 2022-11-08 PROCEDURE — 85652 RBC SED RATE AUTOMATED: CPT

## 2022-11-14 ENCOUNTER — APPOINTMENT (OUTPATIENT)
Dept: WOUND CARE | Facility: HOSPITAL | Age: 67
End: 2022-11-14

## 2022-11-15 ENCOUNTER — APPOINTMENT (OUTPATIENT)
Dept: CARDIOLOGY | Facility: CLINIC | Age: 67
End: 2022-11-15

## 2022-11-21 ENCOUNTER — NON-APPOINTMENT (OUTPATIENT)
Age: 67
End: 2022-11-21

## 2022-12-20 ENCOUNTER — APPOINTMENT (OUTPATIENT)
Dept: CARDIOLOGY | Facility: CLINIC | Age: 67
End: 2022-12-20

## 2023-01-20 ENCOUNTER — APPOINTMENT (OUTPATIENT)
Dept: ULTRASOUND IMAGING | Facility: CLINIC | Age: 68
End: 2023-01-20
Payer: MEDICARE

## 2023-01-20 PROCEDURE — 76770 US EXAM ABDO BACK WALL COMP: CPT

## 2023-01-24 ENCOUNTER — APPOINTMENT (OUTPATIENT)
Dept: CARDIOLOGY | Facility: CLINIC | Age: 68
End: 2023-01-24
Payer: MEDICARE

## 2023-01-24 PROCEDURE — 99214 OFFICE O/P EST MOD 30 MIN: CPT

## 2023-01-30 NOTE — ASSESSMENT
[FreeTextEntry1] : Assessment:\par 1.  Chronic Deep venous insufficiency\par  2.   HTN\par - no YULIA\par - no BRYSON\par 3.  Obesity\par 4.  Coronary disease with PCI\par 5.  Atrial fibrillation on A/C \par 6.  Negative BRYSON testing per patient\par 7.  Peripheral Neuropathy\par \par Plan\par 1.  Leg swelling is better controlled\par 2.  Torsemide as needed\par 3.  BP controlled   \par 4.  Moisturizer to the feet and legs daily \par 5.  Return in 12 months\par 6.  Will see if he is a candidate for renal denervation trial, will discuss with Dr. Soto\par \par
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
## 2023-04-25 ENCOUNTER — APPOINTMENT (OUTPATIENT)
Dept: CARDIOLOGY | Facility: CLINIC | Age: 68
End: 2023-04-25
Payer: MEDICARE

## 2023-04-25 PROCEDURE — 99214 OFFICE O/P EST MOD 30 MIN: CPT

## 2023-04-26 NOTE — PHYSICAL EXAM
[General Appearance - Well Developed] : well developed [Normal Appearance] : normal appearance [Well Groomed] : well groomed [General Appearance - Well Nourished] : well nourished [No Deformities] : no deformities [General Appearance - In No Acute Distress] : no acute distress [Normal Conjunctiva] : the conjunctiva exhibited no abnormalities [Eyelids - No Xanthelasma] : the eyelids demonstrated no xanthelasmas [Normal Oral Mucosa] : normal oral mucosa [No Oral Pallor] : no oral pallor [No Oral Cyanosis] : no oral cyanosis [Normal Jugular Venous A Waves Present] : normal jugular venous A waves present [Normal Jugular Venous V Waves Present] : normal jugular venous V waves present [No Jugular Venous Perdomo A Waves] : no jugular venous perdomo A waves [Respiration, Rhythm And Depth] : normal respiratory rhythm and effort [Exaggerated Use Of Accessory Muscles For Inspiration] : no accessory muscle use [Auscultation Breath Sounds / Voice Sounds] : lungs were clear to auscultation bilaterally [Heart Rate And Rhythm] : heart rate and rhythm were normal [Heart Sounds] : normal S1 and S2 [Murmurs] : no murmurs present [Abdomen Soft] : soft [Abdomen Tenderness] : non-tender [Abdomen Mass (___ Cm)] : no abdominal mass palpated [Skin Color & Pigmentation] : normal skin color and pigmentation [] : no rash [No Venous Stasis] : no venous stasis [Skin Lesions] : no skin lesions [No Skin Ulcers] : no skin ulcer [No Xanthoma] : no  xanthoma was observed [Oriented To Time, Place, And Person] : oriented to person, place, and time [Affect] : the affect was normal [Mood] : the mood was normal [No Anxiety] : not feeling anxious [FreeTextEntry1] : 1+ .  strong pedal pulses.  L shin ulceration .

## 2023-04-26 NOTE — ASSESSMENT
[FreeTextEntry1] : Assessment:\par 1.  Chronic Deep venous insufficiency\par  2.   HTN\par - no YULIA\par - no BRYSON\par 3.  Obesity\par 4.  Coronary disease with PCI\par 5.  Atrial fibrillation on A/C \par 6.  Negative BRYSON testing per patient\par 7.  Peripheral Neuropathy\par \par Plan\par 1.  Will keep on list for potential renal denervation trial\par 2  Resume torsemide every other day\par 3.  Continue Eliquis, metoprolol , losartan 100\par 4.  Labwork with primary care. Compression garments. \par 5.  Moiturizer for the skin daily \par 6.  RTC in 6 months\par 7.  BP check and labs with Dr. Damico.
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
## 2023-10-04 ENCOUNTER — NON-APPOINTMENT (OUTPATIENT)
Age: 68
End: 2023-10-04

## 2023-10-05 ENCOUNTER — APPOINTMENT (OUTPATIENT)
Dept: UROLOGY | Facility: CLINIC | Age: 68
End: 2023-10-05

## 2023-10-06 ENCOUNTER — NON-APPOINTMENT (OUTPATIENT)
Age: 68
End: 2023-10-06

## 2023-10-27 ENCOUNTER — RX RENEWAL (OUTPATIENT)
Age: 68
End: 2023-10-27

## 2023-10-27 RX ORDER — OXYBUTYNIN CHLORIDE 5 MG/1
5 TABLET ORAL
Qty: 180 | Refills: 3 | Status: ACTIVE | COMMUNITY
Start: 2018-06-28 | End: 1900-01-01

## 2023-11-07 ENCOUNTER — APPOINTMENT (OUTPATIENT)
Dept: CARDIOLOGY | Facility: CLINIC | Age: 68
End: 2023-11-07

## 2023-12-19 ENCOUNTER — APPOINTMENT (OUTPATIENT)
Dept: UROLOGY | Facility: CLINIC | Age: 68
End: 2023-12-19

## 2024-03-12 ENCOUNTER — APPOINTMENT (OUTPATIENT)
Dept: CARDIOLOGY | Facility: CLINIC | Age: 69
End: 2024-03-12
Payer: MEDICARE

## 2024-03-12 DIAGNOSIS — R60.0 LOCALIZED EDEMA: ICD-10-CM

## 2024-03-12 DIAGNOSIS — I10 ESSENTIAL (PRIMARY) HYPERTENSION: ICD-10-CM

## 2024-03-12 PROCEDURE — 99214 OFFICE O/P EST MOD 30 MIN: CPT

## 2024-03-12 PROCEDURE — G2211 COMPLEX E/M VISIT ADD ON: CPT
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
## 2024-03-12 NOTE — PHYSICAL EXAM
[General Appearance - Well Developed] : well developed [Normal Appearance] : normal appearance [Well Groomed] : well groomed [General Appearance - Well Nourished] : well nourished [No Deformities] : no deformities [General Appearance - In No Acute Distress] : no acute distress [Normal Conjunctiva] : the conjunctiva exhibited no abnormalities [Eyelids - No Xanthelasma] : the eyelids demonstrated no xanthelasmas [Normal Oral Mucosa] : normal oral mucosa [No Oral Pallor] : no oral pallor [No Oral Cyanosis] : no oral cyanosis [Normal Jugular Venous A Waves Present] : normal jugular venous A waves present [Normal Jugular Venous V Waves Present] : normal jugular venous V waves present [No Jugular Venous Perdomo A Waves] : no jugular venous perdomo A waves [Respiration, Rhythm And Depth] : normal respiratory rhythm and effort [Auscultation Breath Sounds / Voice Sounds] : lungs were clear to auscultation bilaterally [Exaggerated Use Of Accessory Muscles For Inspiration] : no accessory muscle use [Heart Rate And Rhythm] : heart rate and rhythm were normal [Murmurs] : no murmurs present [Heart Sounds] : normal S1 and S2 [Abdomen Soft] : soft [Abdomen Tenderness] : non-tender [Abdomen Mass (___ Cm)] : no abdominal mass palpated [] : no rash [Skin Color & Pigmentation] : normal skin color and pigmentation [Skin Lesions] : no skin lesions [No Venous Stasis] : no venous stasis [No Xanthoma] : no  xanthoma was observed [No Skin Ulcers] : no skin ulcer [Oriented To Time, Place, And Person] : oriented to person, place, and time [Affect] : the affect was normal [No Anxiety] : not feeling anxious [Mood] : the mood was normal [FreeTextEntry1] : poor gait

## 2024-03-12 NOTE — ASSESSMENT
[FreeTextEntry1] : Assessment: 1.  Chronic Deep venous insufficiency  2.   HTN - no YULIA - no BRYSON 3.  Obesity 4.  Coronary disease with PCI 5.  Atrial fibrillation on A/C  6.  Negative BRYSON testing per patient 7.  Peripheral Neuropathy 8.  HTN - resistant   Plan 1.  Will refer to Dr. Soto for consideration of renal denervation  (we previously considered him for SADIA trial many years ago for resistant HTN, he did not receive the AV  device) 2.  He will bring medication list to that visit 3.  He should see wound care for re-look at left lateral leg wound.  Strong pedal pulses on exam  4.  Return in 6 months 5.  Appreciate Dr. Damico cardiac care.

## 2024-03-12 NOTE — REASON FOR VISIT
[Follow-Up - Clinic] : a clinic follow-up of [FreeTextEntry2] : leg swelling, venous insufficiency, resistant HTN [FreeTextEntry1] : 68 year old male with a history of atrial fibrillation, HTN, HLD, obesity, coronary artery disease with PCI

## 2024-03-20 ENCOUNTER — APPOINTMENT (OUTPATIENT)
Dept: UROLOGY | Facility: CLINIC | Age: 69
End: 2024-03-20

## 2024-04-17 ENCOUNTER — APPOINTMENT (OUTPATIENT)
Dept: CARDIOLOGY | Facility: CLINIC | Age: 69
End: 2024-04-17

## 2024-04-18 NOTE — REASON FOR VISIT
[Consultation] : a consultation regarding [Hypertension] : hypertension [FreeTextEntry1] :  68 year old male with a history of leg swelling, venous insufficiency, resistant HTN.atrial fibrillation, HTN, HLD, obesity, coronary artery disease with PCI  ï? ï?· Carvedilol 12.5 MG Oral Tablet; TAKE 1 TABLET TWICE DAILY ï? ï?· Catapres 0.3 MG TABS; TAKE 1 TABLET AT BEDTIME ï? ï?· Crestor 20 MG Oral Tablet; TAKE 1 TABLET DAILY ï? ï?· Eliquis 5 MG Oral Tablet; Take one tablet twice daily ï? ï?· Hyzaar 100-25 MG Oral Tablet; Take 1 tablet daily ï? ï?· Myrbetriq 50 MG Oral Tablet Extended Release 24 Hour; TAKE 1 TABLET BY MOUTH EVERY DAY ï? ï?· oxyBUTYnin Chloride 5 MG Oral Tablet; TAKE 1 TABLET BY MOUTH TWICE A DAY ï? ï?· Torsemide 10 MG Oral Tablet; TAKE 1 TABLET DAILY ï? ï?· VESIcare 5 MG Oral Tablet; Take 1 tablet daily ï? ï?· Vitamin D (Ergocalciferol) 1.25 MG (37829 UT) Oral Capsule; TAKE 1 SOFTGEL BY MOUTH WEEKLY ï? ï?· Vitamin D3 50 MCG (2000 UT) Oral Tablet; Take one tablet daily  Need medication list?

## 2024-06-27 ENCOUNTER — APPOINTMENT (OUTPATIENT)
Dept: UROLOGY | Facility: CLINIC | Age: 69
End: 2024-06-27

## 2024-09-10 ENCOUNTER — APPOINTMENT (OUTPATIENT)
Dept: CARDIOLOGY | Facility: CLINIC | Age: 69
End: 2024-09-10

## 2024-09-10 NOTE — REASON FOR VISIT
[Follow-Up - Clinic] : a clinic follow-up of [FreeTextEntry2] : leg swelling, venous insufficiency, resistant HTN [FreeTextEntry1] : 69 year old male with a history of atrial fibrillation, HTN, HLD, obesity, coronary artery disease with PCI

## 2024-09-10 NOTE — PHYSICAL EXAM
[General Appearance - Well Developed] : well developed [Normal Appearance] : normal appearance [Well Groomed] : well groomed [General Appearance - Well Nourished] : well nourished [No Deformities] : no deformities [General Appearance - In No Acute Distress] : no acute distress [Normal Conjunctiva] : the conjunctiva exhibited no abnormalities [Eyelids - No Xanthelasma] : the eyelids demonstrated no xanthelasmas [Normal Oral Mucosa] : normal oral mucosa [No Oral Pallor] : no oral pallor [No Oral Cyanosis] : no oral cyanosis [Normal Jugular Venous A Waves Present] : normal jugular venous A waves present [Normal Jugular Venous V Waves Present] : normal jugular venous V waves present [No Jugular Venous Perdomo A Waves] : no jugular venous perdomo A waves [Respiration, Rhythm And Depth] : normal respiratory rhythm and effort [Exaggerated Use Of Accessory Muscles For Inspiration] : no accessory muscle use [Auscultation Breath Sounds / Voice Sounds] : lungs were clear to auscultation bilaterally [Heart Rate And Rhythm] : heart rate and rhythm were normal [Heart Sounds] : normal S1 and S2 [Murmurs] : no murmurs present [Abdomen Soft] : soft [Abdomen Tenderness] : non-tender [Abdomen Mass (___ Cm)] : no abdominal mass palpated [FreeTextEntry1] : 1+ .  strong pedal pulses.  hyperpigmentation.  L lateral venous scabs/ wounds [Skin Color & Pigmentation] : normal skin color and pigmentation [] : no rash [No Venous Stasis] : no venous stasis [Skin Lesions] : no skin lesions [No Skin Ulcers] : no skin ulcer [No Xanthoma] : no  xanthoma was observed [Oriented To Time, Place, And Person] : oriented to person, place, and time [Affect] : the affect was normal [Mood] : the mood was normal [No Anxiety] : not feeling anxious
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
## 2025-04-30 NOTE — PHYSICAL EXAM
[General Appearance - Well Developed] : well developed [Normal Appearance] : normal appearance [Well Groomed] : well groomed [General Appearance - Well Nourished] : well nourished [No Deformities] : no deformities [General Appearance - In No Acute Distress] : no acute distress [Normal Conjunctiva] : the conjunctiva exhibited no abnormalities [Eyelids - No Xanthelasma] : the eyelids demonstrated no xanthelasmas [Normal Oral Mucosa] : normal oral mucosa [No Oral Pallor] : no oral pallor [No Oral Cyanosis] : no oral cyanosis [Normal Jugular Venous A Waves Present] : normal jugular venous A waves present [Normal Jugular Venous V Waves Present] : normal jugular venous V waves present [No Jugular Venous Perdomo A Waves] : no jugular venous perdomo A waves [Respiration, Rhythm And Depth] : normal respiratory rhythm and effort [Exaggerated Use Of Accessory Muscles For Inspiration] : no accessory muscle use [Auscultation Breath Sounds / Voice Sounds] : lungs were clear to auscultation bilaterally [Heart Rate And Rhythm] : heart rate and rhythm were normal [Heart Sounds] : normal S1 and S2 [Murmurs] : no murmurs present [Abdomen Soft] : soft [Abdomen Tenderness] : non-tender [Abdomen Mass (___ Cm)] : no abdominal mass palpated anxiety disorder [Skin Color & Pigmentation] : normal skin color and pigmentation [] : no rash [No Venous Stasis] : no venous stasis [No Skin Ulcers] : no skin ulcer [Skin Lesions] : no skin lesions [No Xanthoma] : no  xanthoma was observed [Oriented To Time, Place, And Person] : oriented to person, place, and time [Affect] : the affect was normal [Mood] : the mood was normal [No Anxiety] : not feeling anxious [FreeTextEntry1] : Trace edema .  strong pedal pulses.  L shin ulceration .